# Patient Record
Sex: FEMALE | Race: WHITE | NOT HISPANIC OR LATINO | Employment: FULL TIME | URBAN - METROPOLITAN AREA
[De-identification: names, ages, dates, MRNs, and addresses within clinical notes are randomized per-mention and may not be internally consistent; named-entity substitution may affect disease eponyms.]

---

## 2017-10-24 ENCOUNTER — OFFICE VISIT (OUTPATIENT)
Dept: URGENT CARE | Facility: CLINIC | Age: 47
End: 2017-10-24
Payer: COMMERCIAL

## 2017-10-24 ENCOUNTER — HOSPITAL ENCOUNTER (OUTPATIENT)
Dept: RADIOLOGY | Facility: HOSPITAL | Age: 47
Discharge: HOME | End: 2017-10-24
Attending: PHYSICIAN ASSISTANT
Payer: COMMERCIAL

## 2017-10-24 VITALS
HEART RATE: 90 BPM | TEMPERATURE: 98 F | SYSTOLIC BLOOD PRESSURE: 110 MMHG | WEIGHT: 161.81 LBS | DIASTOLIC BLOOD PRESSURE: 80 MMHG | OXYGEN SATURATION: 100 % | HEIGHT: 64 IN | BODY MASS INDEX: 27.63 KG/M2

## 2017-10-24 DIAGNOSIS — M79.89 SWELLING OF LEFT THUMB: Primary | ICD-10-CM

## 2017-10-24 DIAGNOSIS — S63.622A SPRAIN OF INTERPHALANGEAL JOINT OF LEFT THUMB, INITIAL ENCOUNTER: ICD-10-CM

## 2017-10-24 DIAGNOSIS — M79.89 SWELLING OF LEFT THUMB: ICD-10-CM

## 2017-10-24 PROCEDURE — 99999 PR PBB SHADOW E&M-EST. PATIENT-LVL IV: CPT | Mod: PBBFAC,,, | Performed by: PHYSICIAN ASSISTANT

## 2017-10-24 PROCEDURE — 73140 X-RAY EXAM OF FINGER(S): CPT | Mod: 26,LT,, | Performed by: RADIOLOGY

## 2017-10-24 PROCEDURE — 99213 OFFICE O/P EST LOW 20 MIN: CPT | Mod: S$GLB,,, | Performed by: PHYSICIAN ASSISTANT

## 2017-10-24 PROCEDURE — 73140 X-RAY EXAM OF FINGER(S): CPT | Mod: TC,PO

## 2017-10-24 RX ORDER — NAPROXEN 500 MG/1
500 TABLET ORAL 2 TIMES DAILY PRN
Qty: 30 TABLET | Refills: 0 | Status: SHIPPED | OUTPATIENT
Start: 2017-10-24 | End: 2017-11-03

## 2017-10-24 NOTE — PATIENT INSTRUCTIONS
Finger Sprain  A sprain is a stretching or tearing of the ligaments that hold a joint together. There are no broken bones. Sprains take 3 to 6 weeks to heal.  A sprained finger may be treated with a splint or buddy tape. This is when you tape the injured finger to the one next to it for support. Minor sprains may require no additional support.  Home care  · Keep your hand elevated to reduce pain and swelling. This is very important during the first 48 hours.  · Apply an ice pack over the injured area for 15 to 20 minutes every 3 to 6 hours. You should do this for the first 24 to 48 hours. You can make an ice pack by filling a plastic bag that seals at the top with ice cubes and then wrapping it with a thin towel. Continue the use of ice packs for relief of pain and swelling as needed. As the ice melts, be careful to avoid getting any wrap or splint wet. After 48 hours, apply heat (warm shower or warm bath) for 15 to 20 minutes several times a day, or alternate ice and heat.  · If buddy tape was applied and it becomes wet or dirty, change it. You may replace it with paper, plastic or cloth tape. Cloth tape and paper tapes must be kept dry. Apply gauze or cotton padding between the fingers, especially at the webbed space. This will help prevent the skin from getting moist and breaking down. Keep the buddy tape in place for at least 4 weeks, or as instructed by your healthcare provider.  · If a splint was applied, wear it for the time advised.  · You may use over-the-counter pain medicine to control pain, unless another pain medicine was prescribed. If you have chronic liver or kidney disease or ever had a stomach ulcer or GI bleeding, talk with your healthcare provider before using these medicines.  Follow-up care  Follow up with your healthcare provider as directed. Finger joints will become stiff if immobile for too long. If a splint was applied, ask your healthcare provider when it is safe to begin  range-of-motion exercises.  Sometimes fractures dont show up on the first X-ray. Bruises and sprains can sometimes hurt as much as a fracture. These injuries can take time to heal completely. If your symptoms dont improve or they get worse, talk with your healthcare provider. You may need a repeat X-ray. If X-rays were taken, you will be told of any new findings that may affect your care.  When to seek medical advice  Call your healthcare provider right away if any of these occur:  · Pain or swelling increases  · Fingers or hand becomes cold, blue, numb, or tingly  Date Last Reviewed: 11/20/2015  © 5606-4619 [employer]. [address] [place] [address], [place] [id number]. All rights reserved. This information is not intended as a substitute for professional medical care. Always follow your healthcare professional's instructions.

## 2017-10-24 NOTE — PROGRESS NOTES
"Subjective:      Patient ID: Eunice Hooper is a 47 y.o. female.    Chief Complaint: Hand Pain    Ms. Hooper is a 46yo female that presents to Urgent Care for L thumb pain and swelling.  Patient was painting this weekend and held tray/cup with L hand while painting.  She doesn't recall any traumatic injury.  Patient denies any history of gout.  No warmth, erythema at the site.       Hand Pain    The incident occurred 3 to 5 days ago (Saturday evening). The incident occurred at home. There was no injury mechanism. The pain is present in the left fingers (L thumb). Pertinent negatives include no numbness or tingling. She has tried ice and NSAIDs (wrap, Tylenol #3) for the symptoms. The treatment provided no relief.     Review of Systems   Constitutional: Negative for chills, diaphoresis and fever.   Respiratory: Negative for cough, shortness of breath and wheezing.    Musculoskeletal: Positive for arthralgias (L thumb) and joint swelling (L thumb).   Skin: Negative for color change and wound.   Neurological: Negative for dizziness, tingling, numbness and headaches.       Objective:   /80 (BP Location: Right arm, Patient Position: Sitting, BP Method: Small (Manual))   Pulse 90   Temp 98.2 °F (36.8 °C) (Tympanic)   Ht 5' 4" (1.626 m)   Wt 73.4 kg (161 lb 13.1 oz)   SpO2 100%   BMI 27.78 kg/m²   Physical Exam   Constitutional: She appears well-developed and well-nourished. She does not appear ill. No distress.   HENT:   Head: Normocephalic and atraumatic.   Cardiovascular: Normal rate.    Good capillary refill  Normal radial pulse   Pulmonary/Chest: Effort normal. No respiratory distress.   Musculoskeletal:   (+) TTP and swelling of L thumb at IP joint  Decreased ROM d/t pain   Skin: Skin is warm and dry. No rash noted. She is not diaphoretic.   Psychiatric: She has a normal mood and affect. Her speech is normal and behavior is normal. Thought content normal.     Assessment:      1. Swelling of left " thumb    2. Sprain of interphalangeal joint of left thumb, initial encounter       Plan:   Swelling of left thumb  -     X-Ray Finger 2 or More Views; Future; Expected date: 10/24/2017    Sprain of interphalangeal joint of left thumb, initial encounter  -     naproxen (NAPROSYN) 500 MG tablet; Take 1 tablet (500 mg total) by mouth 2 (two) times daily as needed (pain).  Dispense: 30 tablet; Refill: 0      Reviewed negative x-ray results.    Gave handout on finger sprain.  Printed and reviewed AVS.  Discussed RICE and Naproxen as needed.  If symptoms worsen or do not improve, will refer to ortho.   Patient expresses understanding and agrees with treatment plan.

## 2018-03-05 ENCOUNTER — OFFICE VISIT (OUTPATIENT)
Dept: URGENT CARE | Facility: CLINIC | Age: 48
End: 2018-03-05
Payer: COMMERCIAL

## 2018-03-05 VITALS
BODY MASS INDEX: 28 KG/M2 | WEIGHT: 163.13 LBS | OXYGEN SATURATION: 99 % | RESPIRATION RATE: 16 BRPM | SYSTOLIC BLOOD PRESSURE: 120 MMHG | HEART RATE: 77 BPM | TEMPERATURE: 99 F | DIASTOLIC BLOOD PRESSURE: 80 MMHG

## 2018-03-05 DIAGNOSIS — R10.13 EPIGASTRIC PAIN: Primary | ICD-10-CM

## 2018-03-05 DIAGNOSIS — R11.0 NAUSEA: ICD-10-CM

## 2018-03-05 PROCEDURE — 99999 PR PBB SHADOW E&M-EST. PATIENT-LVL IV: CPT | Mod: PBBFAC,,, | Performed by: NURSE PRACTITIONER

## 2018-03-05 PROCEDURE — 99214 OFFICE O/P EST MOD 30 MIN: CPT | Mod: S$GLB,,, | Performed by: NURSE PRACTITIONER

## 2018-03-05 RX ORDER — HYDROGEN PEROXIDE 3 %
20 SOLUTION, NON-ORAL MISCELLANEOUS
Qty: 30 CAPSULE | Refills: 0 | Status: SHIPPED | OUTPATIENT
Start: 2018-03-05 | End: 2018-10-18

## 2018-03-05 RX ORDER — ONDANSETRON 4 MG/1
4 TABLET, ORALLY DISINTEGRATING ORAL EVERY 8 HOURS PRN
Qty: 10 TABLET | Refills: 0 | Status: SHIPPED | OUTPATIENT
Start: 2018-03-05 | End: 2018-10-18

## 2018-03-05 NOTE — PROGRESS NOTES
Subjective:       Patient ID: Eunice Hooper is a 47 y.o. female.    Chief Complaint: Abdominal Pain    Pt is a 47 year old female to clinic today with complaints of epigastric pain that began Friday. Pt states pain is worse after eating.       Abdominal Pain   This is a new problem. The current episode started in the past 7 days. The onset quality is gradual. The problem occurs constantly. The problem has been unchanged. The pain is located in the epigastric region. The pain is at a severity of 8/10. The pain is severe. The quality of the pain is sharp. The abdominal pain does not radiate. Associated symptoms include belching, flatus and nausea. Pertinent negatives include no anorexia, arthralgias, constipation, diarrhea, dysuria, fever, frequency, headaches, hematochezia, hematuria, melena, myalgias, vomiting or weight loss. The pain is aggravated by eating. The pain is relieved by nothing. She has tried acetaminophen (ibuprofen, pepto) for the symptoms. Her past medical history is significant for abdominal surgery (gastric bypass (reversed)) and GERD.     Review of Systems   Constitutional: Negative for activity change, appetite change, chills, diaphoresis, fatigue, fever and weight loss.   HENT: Negative for congestion, sinus pressure and sore throat.    Eyes: Negative for pain.   Respiratory: Negative for cough, chest tightness, shortness of breath and wheezing.    Cardiovascular: Negative for chest pain and palpitations.   Gastrointestinal: Positive for abdominal pain, flatus and nausea. Negative for abdominal distention, anorexia, blood in stool, constipation, diarrhea, hematochezia, melena and vomiting.   Genitourinary: Negative for dysuria, frequency and hematuria.   Musculoskeletal: Negative for arthralgias, myalgias and neck pain.   Skin: Negative for rash.   Neurological: Negative for dizziness, weakness, light-headedness and headaches.       Objective:      Physical Exam   Constitutional: She  is oriented to person, place, and time. She appears well-developed and well-nourished.  Non-toxic appearance. She does not have a sickly appearance. She does not appear ill. No distress.   HENT:   Head: Normocephalic.   Right Ear: External ear normal.   Left Ear: External ear normal.   Nose: Nose normal.   Mouth/Throat: Oropharynx is clear and moist and mucous membranes are normal.   Eyes: Pupils are equal, round, and reactive to light.   Abdominal: Soft. Normal appearance and bowel sounds are normal. She exhibits no distension. There is no tenderness. There is no rigidity, no rebound, no guarding, no tenderness at McBurney's point and negative Diaz's sign.   Neurological: She is alert and oriented to person, place, and time.   Skin: Skin is warm and dry. No rash noted. She is not diaphoretic.   Psychiatric: She has a normal mood and affect. Her speech is normal and behavior is normal. Thought content normal.   Nursing note and vitals reviewed.      Assessment:       1. Epigastric pain    2. Nausea        Plan:   Epigastric pain  -     (pyxis) gi cocktail (mylanta 30 mL, lidocaine 2 % viscous 10 mL, dicyclomine 10 mL) 50 mL; Take by mouth one time.  -     esomeprazole (NEXIUM) 20 MG capsule; Take 1 capsule (20 mg total) by mouth before breakfast. Take 1 hour before meal.  Dispense: 30 capsule; Refill: 0    Nausea  -     ondansetron (ZOFRAN-ODT) 4 MG TbDL; Take 1 tablet (4 mg total) by mouth every 8 (eight) hours as needed (nausea).  Dispense: 10 tablet; Refill: 0      Pt states significant improvement post GI cocktail.  Recommend pt f/u with GI if symptoms persist.     Follow prescribed treatment plan as directed.  Stay hydrated and rest.  Report to ER if symptoms worsen.  Follow up with PCP in 2-3 days or sooner if symptoms do not improve.

## 2018-03-06 NOTE — PATIENT INSTRUCTIONS
Epigastric Pain (Uncertain Cause)     Epigastric pain can be a sign of disease in the upper abdomen. Common causes include:  · Acid reflux (stomach acid flowing up into the esophagus)  · Gastritis (irritation of the stomach lining)  · Peptic Ulcer Disease  · Inflammation of the pancreas  · Gallstone  · Infection in the gallbladder  Pain may be dull or burning. It may spread upward to the chest or to the back. There may be other symptoms such as belching, bloating, cramps or hunger pains. There may be weight loss or poor appetite, nausea or vomiting.  Since the diagnosis of your pain is not certain yet, further tests may sometimes be needed. Sometimes the doctor will treat you for the most likely condition to see if there is improvement before doing further tests.  Home care  Medicines  · Antacids help neutralize the normal acids in your stomach. Examples are Maalox, Mylanta, Rolaids, and Tums. If you dont like the liquid, you can also try a chewable one. You may find one works better than another for you. Overuse can cause diarrhea or constipation.  · Acid blockers (H2 blockers) decrease acid production. Examples are cimetidine (Tagamet), famotidine (Pepcid) and ranitidine (Zantac).  · Acid inhibitors (PPIs) decrease acid production in a different way than the blockers. You may find they work better, but can take a little longer to take effect.  Examples are omeprazole (Prilosec), lansoprazole (Prevacid), pantoprazole (Protonix), rabeprazole (Aciphex), and esomeprazole (Nexium).  · Take an antacid 30-60 minutes after eating and at bedtime, but not at the same time as an acid blocker.  · Try not to take NSAIDs. Aspirin may also cause problems, but if taking it for your heart or other medical reasons, talk to your doctor before stopping it; you do not want to cause a worse problem, like a heart attack or stroke.  Diet  · If certain foods seem to cause your spasm, try to avoid them.   · Eat slowly and chew food well  before swallowing. Symptoms of gastritis can be worsened by certain foods. Limit or avoid fatty, fried, and spicy foods, as well as coffee, chocolate, mint, and foods with high acid content such as tomatoes and citrus fruit and juices (orange, grapefruit, lemon).  · Avoid alcohol, caffeine, and tobacco, which can delay healing and worsen your problem.  · Try eating smaller meals with snacks in between  Follow-up care  Follow up with your healthcare provider or as advised.  When to seek medical advice  Call your healthcare provider right away if any of the following occur:  · Stomach pain worsens or moves to the right lower part of the abdomen  · Chest pain appears, or if it worsens or spreads to the chest, back, neck, shoulder, or arm  · Frequent vomiting (cant keep down liquids)  · Blood in the stool or vomit (red or black color)  · Feeling weak or dizzy, fainting, or having trouble breathing  · Fever of 100.4ºF (38ºC) or higher, or as directed by your healthcare provider  · Abdominal swelling  Date Last Reviewed: 9/25/2015  © 6496-1718 [employer]. [address] [place] [address]. All rights reserved. This information is not intended as a substitute for professional medical care. Always follow your healthcare professional's instructions.        Epigastric Pain (Uncertain Cause)     Epigastric pain can be a sign of disease in the upper abdomen. Common causes include:  · Acid reflux (stomach acid flowing up into the esophagus)  · Gastritis (irritation of the stomach lining)  · Peptic Ulcer Disease  · Inflammation of the pancreas  · Gallstone  · Infection in the gallbladder  Pain may be dull or burning. It may spread upward to the chest or to the back. There may be other symptoms such as belching, bloating, cramps or hunger pains. There may be weight loss or poor appetite, nausea or vomiting.  Since the diagnosis of your pain is not certain yet, further tests may sometimes be needed.  Sometimes the doctor will treat you for the most likely condition to see if there is improvement before doing further tests.  Home care  Medicines  · Antacids help neutralize the normal acids in your stomach. Examples are Maalox, Mylanta, Rolaids, and Tums. If you dont like the liquid, you can also try a chewable one. You may find one works better than another for you. Overuse can cause diarrhea or constipation.  · Acid blockers (H2 blockers) decrease acid production. Examples are cimetidine (Tagamet), famotidine (Pepcid) and ranitidine (Zantac).  · Acid inhibitors (PPIs) decrease acid production in a different way than the blockers. You may find they work better, but can take a little longer to take effect.  Examples are omeprazole (Prilosec), lansoprazole (Prevacid), pantoprazole (Protonix), rabeprazole (Aciphex), and esomeprazole (Nexium).  · Take an antacid 30-60 minutes after eating and at bedtime, but not at the same time as an acid blocker.  · Try not to take NSAIDs. Aspirin may also cause problems, but if taking it for your heart or other medical reasons, talk to your doctor before stopping it; you do not want to cause a worse problem, like a heart attack or stroke.  Diet  · If certain foods seem to cause your spasm, try to avoid them.   · Eat slowly and chew food well before swallowing. Symptoms of gastritis can be worsened by certain foods. Limit or avoid fatty, fried, and spicy foods, as well as coffee, chocolate, mint, and foods with high acid content such as tomatoes and citrus fruit and juices (orange, grapefruit, lemon).  · Avoid alcohol, caffeine, and tobacco, which can delay healing and worsen your problem.  · Try eating smaller meals with snacks in between  Follow-up care  Follow up with your healthcare provider or as advised.  When to seek medical advice  Call your healthcare provider right away if any of the following occur:  · Stomach pain worsens or moves to the right lower part of the  abdomen  · Chest pain appears, or if it worsens or spreads to the chest, back, neck, shoulder, or arm  · Frequent vomiting (cant keep down liquids)  · Blood in the stool or vomit (red or black color)  · Feeling weak or dizzy, fainting, or having trouble breathing  · Fever of 100.4ºF (38ºC) or higher, or as directed by your healthcare provider  · Abdominal swelling  Date Last Reviewed: 9/25/2015 © 2000-2017 [employer]. [address] [place], [place] [id number]. All rights reserved. This information is not intended as a substitute for professional medical care. Always follow your healthcare professional's instructions.

## 2018-10-18 ENCOUNTER — OFFICE VISIT (OUTPATIENT)
Dept: INTERNAL MEDICINE | Facility: CLINIC | Age: 48
End: 2018-10-18
Payer: COMMERCIAL

## 2018-10-18 VITALS
TEMPERATURE: 98 F | HEIGHT: 64 IN | HEART RATE: 62 BPM | WEIGHT: 163.38 LBS | DIASTOLIC BLOOD PRESSURE: 84 MMHG | BODY MASS INDEX: 27.89 KG/M2 | SYSTOLIC BLOOD PRESSURE: 120 MMHG

## 2018-10-18 DIAGNOSIS — Z12.39 BREAST CANCER SCREENING: ICD-10-CM

## 2018-10-18 DIAGNOSIS — Z29.9 PREVENTIVE MEASURE: ICD-10-CM

## 2018-10-18 DIAGNOSIS — R07.9 CHEST PAIN, UNSPECIFIED TYPE: ICD-10-CM

## 2018-10-18 DIAGNOSIS — F41.1 GAD (GENERALIZED ANXIETY DISORDER): Primary | ICD-10-CM

## 2018-10-18 DIAGNOSIS — R45.89 DEPRESSED MOOD: ICD-10-CM

## 2018-10-18 DIAGNOSIS — R06.02 SOB (SHORTNESS OF BREATH) ON EXERTION: ICD-10-CM

## 2018-10-18 PROCEDURE — 93005 ELECTROCARDIOGRAM TRACING: CPT | Mod: S$GLB,,, | Performed by: FAMILY MEDICINE

## 2018-10-18 PROCEDURE — 93010 ELECTROCARDIOGRAM REPORT: CPT | Mod: S$GLB,,, | Performed by: INTERNAL MEDICINE

## 2018-10-18 PROCEDURE — 3008F BODY MASS INDEX DOCD: CPT | Mod: CPTII,S$GLB,, | Performed by: FAMILY MEDICINE

## 2018-10-18 PROCEDURE — 99999 PR PBB SHADOW E&M-EST. PATIENT-LVL III: CPT | Mod: PBBFAC,,, | Performed by: FAMILY MEDICINE

## 2018-10-18 PROCEDURE — 99214 OFFICE O/P EST MOD 30 MIN: CPT | Mod: S$GLB,,, | Performed by: FAMILY MEDICINE

## 2018-10-18 RX ORDER — VENLAFAXINE HYDROCHLORIDE 37.5 MG/1
37.5 CAPSULE, EXTENDED RELEASE ORAL DAILY
Qty: 30 CAPSULE | Refills: 11 | Status: SHIPPED | OUTPATIENT
Start: 2018-10-18 | End: 2019-11-04 | Stop reason: SDUPTHER

## 2018-10-18 RX ORDER — ATORVASTATIN CALCIUM 20 MG/1
1 TABLET, FILM COATED ORAL DAILY
Refills: 2 | COMMUNITY
Start: 2018-10-05 | End: 2019-10-24 | Stop reason: SDUPTHER

## 2018-10-18 RX ORDER — DULOXETINE 40 MG/1
1 CAPSULE, DELAYED RELEASE ORAL DAILY
Refills: 0 | COMMUNITY
Start: 2018-09-11 | End: 2018-10-18 | Stop reason: SINTOL

## 2018-10-18 RX ORDER — ESOMEPRAZOLE MAGNESIUM 40 MG/1
1 CAPSULE, DELAYED RELEASE ORAL DAILY
Refills: 2 | COMMUNITY
Start: 2018-10-06 | End: 2019-12-03

## 2018-10-18 RX ORDER — ALPRAZOLAM 0.5 MG/1
1 TABLET ORAL 2 TIMES DAILY PRN
Refills: 2 | COMMUNITY
Start: 2018-10-05 | End: 2019-01-24 | Stop reason: SDUPTHER

## 2018-10-18 RX ORDER — ASPIRIN 325 MG
1 TABLET, DELAYED RELEASE (ENTERIC COATED) ORAL
Refills: 1 | COMMUNITY
Start: 2018-10-09 | End: 2019-12-03

## 2018-10-18 NOTE — PROGRESS NOTES
Subjective:      Patient ID: Eunice Hooper is a 48 y.o. female.    Chief Complaint: Establish Care (discuss medication )    HPI  47 yo female smoker with hyperlipidemia here to establish care.  She has been on cymbalta for anxiety/depression.  It wasn't working well so it was increased.  At higher doses she had bad nightmares.  She told her other PCP and was advised to start wellbutrin.  She stopped the cymbalta and started the wellbutrin but started having clicking in her brain.  Went back on cymbalta and stopped the wellbutrin and felt better but wants to try something else b/c of nightmares.  She has panic attacks, uses Xanax PRN.  She is on stain daily as well as PPI.  She is taking high dose of Vit D.  She reports that she has issues at times with chest pain/pressure and SOB on exertion.  For instance, at works if she is trying to lift boxes and physically exert herself she gets tight in her chest and has to stop.  It can happen at home as well with exertion.  She told last PCP, had normal EKG.  Strong fam hx of heart disease.    Past Medical History:   Diagnosis Date    Anxiety     Depression     GERD (gastroesophageal reflux disease)     Hyperlipidemia      Family History   Problem Relation Age of Onset    Heart disease Father     Breast cancer Maternal Grandmother     Diabetes Paternal Grandmother     Heart disease Paternal Grandfather     Breast cancer Maternal Aunt     Breast cancer Maternal Cousin     Colon cancer Neg Hx      Past Surgical History:   Procedure Laterality Date    BARIATRIC SURGERY      Gastric bypass    breast augmentation      HYSTERECTOMY      TUBAL LIGATION       Social History     Tobacco Use    Smoking status: Current Every Day Smoker     Packs/day: 1.00     Years: 20.00     Pack years: 20.00     Types: Cigarettes    Smokeless tobacco: Never Used   Substance Use Topics    Alcohol use: Yes     Comment: occasional    Drug use: No       /84 (BP  "Location: Left arm, Patient Position: Sitting, BP Method: Large (Manual))   Pulse 62   Temp 98 °F (36.7 °C) (Tympanic)   Ht 5' 3.75" (1.619 m)   Wt 74.1 kg (163 lb 5.8 oz)   BMI 28.26 kg/m²     Review of Systems   Constitutional: Negative for activity change and unexpected weight change.   HENT: Negative for hearing loss, rhinorrhea and trouble swallowing.    Eyes: Negative for discharge and visual disturbance.   Respiratory: Positive for shortness of breath. Negative for chest tightness and wheezing.    Cardiovascular: Positive for chest pain. Negative for palpitations.   Gastrointestinal: Negative for blood in stool, constipation, diarrhea and vomiting.   Endocrine: Negative for polydipsia and polyuria.   Genitourinary: Negative for difficulty urinating, dysuria, hematuria and menstrual problem.   Musculoskeletal: Negative for arthralgias, joint swelling and neck pain.   Neurological: Positive for headaches. Negative for weakness.   Psychiatric/Behavioral: Positive for dysphoric mood. Negative for confusion.       Objective:     Physical Exam   Constitutional: She is oriented to person, place, and time. She appears well-developed and well-nourished. No distress.   HENT:   Right Ear: External ear normal.   Left Ear: External ear normal.   Nose: Nose normal.   Mouth/Throat: Oropharynx is clear and moist.   Eyes: Conjunctivae are normal. Pupils are equal, round, and reactive to light.   Neck: Normal range of motion. Neck supple. Carotid bruit is not present. No thyromegaly present.   Cardiovascular: Normal rate, regular rhythm and normal heart sounds.   Pulmonary/Chest: Effort normal and breath sounds normal. No respiratory distress. She has no wheezes. She has no rales.   Abdominal: Soft. Bowel sounds are normal. She exhibits no distension. There is no tenderness. There is no guarding.   Musculoskeletal: She exhibits no edema.   Lymphadenopathy:     She has no cervical adenopathy.   Neurological: She is alert " and oriented to person, place, and time. No cranial nerve deficit.   Skin: Skin is warm and dry. No rash noted.   Psychiatric: She has a normal mood and affect. Her behavior is normal. Judgment and thought content normal.   Nursing note and vitals reviewed.      Lab Results   Component Value Date    WBC 9.00 04/28/2010    HGB 13.0 04/28/2010    HCT 37.7 04/28/2010     04/28/2010    ALT 29 04/28/2010    AST 13 04/28/2010     04/28/2010    K 3.7 04/28/2010     04/28/2010    CREATININE 1.0 04/28/2010    BUN 10 04/28/2010    CO2 26.5 04/28/2010       Assessment:     1. CATINA (generalized anxiety disorder)    2. Depressed mood    3. Chest pain, unspecified type    4. SOB (shortness of breath) on exertion    5. Breast cancer screening    6. Preventive measure         Plan:     CATINA (generalized anxiety disorder)    Depressed mood    Chest pain, unspecified type  -     EKG 12-lead    SOB (shortness of breath) on exertion  -     EKG 12-lead    Breast cancer screening  -     Mammo Digital Screening Bilat with CAD; Future; Expected date: 10/18/2018    Preventive measure  -     CBC auto differential; Future; Expected date: 11/15/2018  -     Comprehensive metabolic panel; Future; Expected date: 11/15/2018  -     Hemoglobin A1c; Future; Expected date: 11/15/2018  -     Lipid panel; Future; Expected date: 11/15/2018  -     TSH; Future; Expected date: 11/15/2018  -     Vitamin D; Future; Expected date: 11/15/2018  -     Ferritin; Future; Expected date: 11/15/2018    Other orders  -     venlafaxine (EFFEXOR-XR) 37.5 MG 24 hr capsule; Take 1 capsule (37.5 mg total) by mouth once daily.  Dispense: 30 capsule; Refill: 11    Stop the wellbutrin and cymbalta  Start Effexor 37.5mg with intention of increasing in a few wks.  Pt can let me know if having any SE.  Xanax PRN panic attacks  EKG done today, will see how baseline looks but given risk factors pt will need stress test.  Will call with orders once above is  reviewed  Update labs/Mammo and f/u in 4 wks  Smoking cessation advised

## 2018-10-19 ENCOUNTER — PATIENT MESSAGE (OUTPATIENT)
Dept: INTERNAL MEDICINE | Facility: CLINIC | Age: 48
End: 2018-10-19

## 2018-10-22 ENCOUNTER — HOSPITAL ENCOUNTER (OUTPATIENT)
Dept: RADIOLOGY | Facility: HOSPITAL | Age: 48
Discharge: HOME | End: 2018-10-22
Attending: FAMILY MEDICINE
Payer: COMMERCIAL

## 2018-10-22 VITALS — HEIGHT: 64 IN | WEIGHT: 163 LBS | BODY MASS INDEX: 27.83 KG/M2

## 2018-10-22 DIAGNOSIS — Z12.39 BREAST CANCER SCREENING: ICD-10-CM

## 2018-10-22 PROCEDURE — 77067 SCR MAMMO BI INCL CAD: CPT | Mod: 26,,, | Performed by: RADIOLOGY

## 2018-10-22 PROCEDURE — 77063 BREAST TOMOSYNTHESIS BI: CPT | Mod: 26,,, | Performed by: RADIOLOGY

## 2018-10-22 PROCEDURE — 77063 BREAST TOMOSYNTHESIS BI: CPT | Mod: TC,PO

## 2018-10-22 PROCEDURE — 77067 SCR MAMMO BI INCL CAD: CPT | Mod: TC,PO

## 2018-11-12 ENCOUNTER — LAB VISIT (OUTPATIENT)
Dept: LAB | Facility: HOSPITAL | Age: 48
End: 2018-11-12
Attending: FAMILY MEDICINE
Payer: COMMERCIAL

## 2018-11-12 DIAGNOSIS — Z29.9 PREVENTIVE MEASURE: ICD-10-CM

## 2018-11-12 LAB
ALBUMIN SERPL BCP-MCNC: 3.6 G/DL
ALP SERPL-CCNC: 60 U/L
ALT SERPL W/O P-5'-P-CCNC: 44 U/L
ANION GAP SERPL CALC-SCNC: 9 MMOL/L
AST SERPL-CCNC: 46 U/L
BASOPHILS # BLD AUTO: 0.08 K/UL
BASOPHILS NFR BLD: 0.9 %
BILIRUB SERPL-MCNC: 0.5 MG/DL
BUN SERPL-MCNC: 15 MG/DL
CALCIUM SERPL-MCNC: 9.4 MG/DL
CHLORIDE SERPL-SCNC: 107 MMOL/L
CHOLEST SERPL-MCNC: 203 MG/DL
CHOLEST/HDLC SERPL: 4.4 {RATIO}
CO2 SERPL-SCNC: 26 MMOL/L
CREAT SERPL-MCNC: 0.8 MG/DL
DIFFERENTIAL METHOD: ABNORMAL
EOSINOPHIL # BLD AUTO: 0.2 K/UL
EOSINOPHIL NFR BLD: 2.7 %
ERYTHROCYTE [DISTWIDTH] IN BLOOD BY AUTOMATED COUNT: 14 %
EST. GFR  (AFRICAN AMERICAN): >60 ML/MIN/1.73 M^2
EST. GFR  (NON AFRICAN AMERICAN): >60 ML/MIN/1.73 M^2
ESTIMATED AVG GLUCOSE: 94 MG/DL
FERRITIN SERPL-MCNC: 79 NG/ML
GLUCOSE SERPL-MCNC: 69 MG/DL
HBA1C MFR BLD HPLC: 4.9 %
HCT VFR BLD AUTO: 46.3 %
HDLC SERPL-MCNC: 46 MG/DL
HDLC SERPL: 22.7 %
HGB BLD-MCNC: 14.7 G/DL
IMM GRANULOCYTES # BLD AUTO: 0.03 K/UL
IMM GRANULOCYTES NFR BLD AUTO: 0.3 %
LDLC SERPL CALC-MCNC: 135.2 MG/DL
LYMPHOCYTES # BLD AUTO: 2 K/UL
LYMPHOCYTES NFR BLD: 22.9 %
MCH RBC QN AUTO: 29.2 PG
MCHC RBC AUTO-ENTMCNC: 31.7 G/DL
MCV RBC AUTO: 92 FL
MONOCYTES # BLD AUTO: 0.6 K/UL
MONOCYTES NFR BLD: 6.6 %
NEUTROPHILS # BLD AUTO: 5.9 K/UL
NEUTROPHILS NFR BLD: 66.6 %
NONHDLC SERPL-MCNC: 157 MG/DL
NRBC BLD-RTO: 0 /100 WBC
PLATELET # BLD AUTO: 289 K/UL
PMV BLD AUTO: 11.3 FL
POTASSIUM SERPL-SCNC: 3.9 MMOL/L
PROT SERPL-MCNC: 6.6 G/DL
RBC # BLD AUTO: 5.03 M/UL
SODIUM SERPL-SCNC: 142 MMOL/L
TRIGL SERPL-MCNC: 109 MG/DL
TSH SERPL DL<=0.005 MIU/L-ACNC: 1.41 UIU/ML
WBC # BLD AUTO: 8.83 K/UL

## 2018-11-12 PROCEDURE — 80053 COMPREHEN METABOLIC PANEL: CPT

## 2018-11-12 PROCEDURE — 85025 COMPLETE CBC W/AUTO DIFF WBC: CPT

## 2018-11-12 PROCEDURE — 82306 VITAMIN D 25 HYDROXY: CPT

## 2018-11-12 PROCEDURE — 36415 COLL VENOUS BLD VENIPUNCTURE: CPT | Mod: PO

## 2018-11-12 PROCEDURE — 83036 HEMOGLOBIN GLYCOSYLATED A1C: CPT

## 2018-11-12 PROCEDURE — 84443 ASSAY THYROID STIM HORMONE: CPT

## 2018-11-12 PROCEDURE — 80061 LIPID PANEL: CPT

## 2018-11-12 PROCEDURE — 82728 ASSAY OF FERRITIN: CPT

## 2018-11-13 LAB — 25(OH)D3+25(OH)D2 SERPL-MCNC: 34 NG/ML

## 2018-11-20 ENCOUNTER — TELEPHONE (OUTPATIENT)
Dept: INTERNAL MEDICINE | Facility: CLINIC | Age: 48
End: 2018-11-20

## 2018-11-20 ENCOUNTER — OFFICE VISIT (OUTPATIENT)
Dept: INTERNAL MEDICINE | Facility: CLINIC | Age: 48
End: 2018-11-20
Payer: COMMERCIAL

## 2018-11-20 VITALS
WEIGHT: 163.56 LBS | SYSTOLIC BLOOD PRESSURE: 118 MMHG | TEMPERATURE: 98 F | BODY MASS INDEX: 27.92 KG/M2 | HEART RATE: 76 BPM | DIASTOLIC BLOOD PRESSURE: 70 MMHG | HEIGHT: 64 IN

## 2018-11-20 DIAGNOSIS — Z00.00 ANNUAL PHYSICAL EXAM: Primary | ICD-10-CM

## 2018-11-20 DIAGNOSIS — R07.9 CHEST PAIN, UNSPECIFIED TYPE: ICD-10-CM

## 2018-11-20 DIAGNOSIS — Z12.4 CERVICAL CANCER SCREENING: ICD-10-CM

## 2018-11-20 PROCEDURE — 99396 PREV VISIT EST AGE 40-64: CPT | Mod: 25,S$GLB,, | Performed by: FAMILY MEDICINE

## 2018-11-20 PROCEDURE — 90715 TDAP VACCINE 7 YRS/> IM: CPT | Mod: S$GLB,,, | Performed by: FAMILY MEDICINE

## 2018-11-20 PROCEDURE — 90471 IMMUNIZATION ADMIN: CPT | Mod: S$GLB,,, | Performed by: FAMILY MEDICINE

## 2018-11-20 PROCEDURE — 99999 PR PBB SHADOW E&M-EST. PATIENT-LVL III: CPT | Mod: PBBFAC,,, | Performed by: FAMILY MEDICINE

## 2018-11-20 RX ORDER — MUPIROCIN 20 MG/G
OINTMENT TOPICAL 2 TIMES DAILY
Qty: 30 G | Refills: 0 | Status: SHIPPED | OUTPATIENT
Start: 2018-11-20 | End: 2018-11-21 | Stop reason: SDUPTHER

## 2018-11-20 NOTE — PROGRESS NOTES
"Subjective:      Patient ID: Eunice Hooper is a 48 y.o. female.    Chief Complaint: Annual Exam; Chest Pain; and Abscess    HPI  49 yo female here for annual visit.  Doing well on Effexor so far.  Cont to have chest pain with exertion and activity.  She also has a small bump in vaginal area.  She was able to open one of them but one is still swollen and hurting.  No fever/chills.    Mammo was done    Past Medical History:   Diagnosis Date    Anxiety     Depression     GERD (gastroesophageal reflux disease)     Hyperlipidemia      Family History   Problem Relation Age of Onset    Heart disease Father     Breast cancer Maternal Grandmother     Diabetes Paternal Grandmother     Heart disease Paternal Grandfather     Breast cancer Maternal Aunt     Breast cancer Maternal Cousin     Colon cancer Neg Hx      Past Surgical History:   Procedure Laterality Date    BARIATRIC SURGERY      Gastric bypass    breast augmentation      BREAST SURGERY Bilateral     10/2016    HYSTERECTOMY      TUBAL LIGATION       Social History     Tobacco Use    Smoking status: Current Every Day Smoker     Packs/day: 1.00     Years: 20.00     Pack years: 20.00     Types: Cigarettes    Smokeless tobacco: Never Used   Substance Use Topics    Alcohol use: Yes     Comment: occasional    Drug use: No       /70 (BP Location: Right arm, Patient Position: Sitting, BP Method: Large (Manual))   Pulse 76   Temp 97.9 °F (36.6 °C) (Oral)   Ht 5' 4" (1.626 m)   Wt 74.2 kg (163 lb 9.3 oz)   BMI 28.08 kg/m²     Review of Systems   Constitutional: Negative for activity change, appetite change, chills, diaphoresis, fatigue, fever and unexpected weight change.   HENT: Negative for ear pain, hearing loss, postnasal drip, rhinorrhea and tinnitus.    Eyes: Negative for visual disturbance.   Respiratory: Negative for cough, shortness of breath and wheezing.    Cardiovascular: Positive for chest pain. Negative for palpitations and " leg swelling.   Gastrointestinal: Negative for abdominal distention.   Genitourinary: Negative for dysuria, frequency, hematuria and urgency.   Musculoskeletal: Negative for joint swelling.   Neurological: Negative for weakness and headaches.       Objective:     Physical Exam   Constitutional: She is oriented to person, place, and time. She appears well-developed and well-nourished. No distress.   HENT:   Right Ear: External ear normal.   Left Ear: External ear normal.   Nose: Nose normal.   Mouth/Throat: Oropharynx is clear and moist.   Eyes: Conjunctivae are normal. Pupils are equal, round, and reactive to light.   Neck: Normal range of motion. Neck supple. Carotid bruit is not present.   Cardiovascular: Normal rate, regular rhythm and normal heart sounds.   Pulmonary/Chest: Effort normal and breath sounds normal. No respiratory distress. She has no wheezes. She has no rales.   Abdominal: Soft. Bowel sounds are normal. She exhibits no distension. There is no tenderness. There is no guarding.   Genitourinary: Rectum normal and vagina normal. Right adnexum displays no tenderness and no fullness. Left adnexum displays no tenderness and no fullness.   Genitourinary Comments: Vaginal tissue normal  Uterus absent  Small ingrown hair on lower left inner thigh near vaginal area.   Musculoskeletal: She exhibits no edema.   Neurological: She is alert and oriented to person, place, and time. No cranial nerve deficit.   Skin: Skin is warm and dry. No rash noted.   Psychiatric: She has a normal mood and affect. Her behavior is normal. Judgment and thought content normal.   Nursing note and vitals reviewed.      Lab Results   Component Value Date    WBC 8.83 11/12/2018    HGB 14.7 11/12/2018    HCT 46.3 11/12/2018     11/12/2018    CHOL 203 (H) 11/12/2018    TRIG 109 11/12/2018    HDL 46 11/12/2018    ALT 44 11/12/2018    AST 46 (H) 11/12/2018     11/12/2018    K 3.9 11/12/2018     11/12/2018    CREATININE  0.8 11/12/2018    BUN 15 11/12/2018    CO2 26 11/12/2018    TSH 1.406 11/12/2018    HGBA1C 4.9 11/12/2018       Assessment:     1. Annual physical exam    2. Cervical cancer screening    3. Chest pain, unspecified type         Plan:     Annual physical exam    Cervical cancer screening  -     Cancel: Liquid-based pap smear, screening    Chest pain, unspecified type  -     Ambulatory referral to Cardiology    Other orders  -     (In Office Administered) Tdap Vaccine    22 gauge needle used to remove the top of the ingrown hair.  +pus/minimally.  No induration/fluctuation  Soaks daily, can use topical bactroban.  Cardiology referral//strong fam hx.  Pt on lipitor/smoker  Reviewed labs, stable  Focus on healthy eating/exercise and smoking cessation.  Adacel shot today  F/u annually and PRN

## 2018-11-20 NOTE — TELEPHONE ENCOUNTER
Let pt know that I went on and sent in a topical Abx ointment to use on the ingrown hair.    Also let her know I am not sending off for pap since she has no cervix.  Her pelvic exam is normal.

## 2018-11-21 ENCOUNTER — PATIENT MESSAGE (OUTPATIENT)
Dept: INTERNAL MEDICINE | Facility: CLINIC | Age: 48
End: 2018-11-21

## 2018-11-21 RX ORDER — ONDANSETRON 8 MG/1
8 TABLET, ORALLY DISINTEGRATING ORAL EVERY 8 HOURS PRN
Qty: 15 TABLET | Refills: 0 | Status: SHIPPED | OUTPATIENT
Start: 2018-11-21 | End: 2019-05-24 | Stop reason: SDUPTHER

## 2018-11-23 RX ORDER — MUPIROCIN 20 MG/G
OINTMENT TOPICAL
Qty: 22 G | Refills: 0 | Status: SHIPPED | OUTPATIENT
Start: 2018-11-23 | End: 2019-12-03

## 2018-12-04 ENCOUNTER — OFFICE VISIT (OUTPATIENT)
Dept: CARDIOLOGY | Facility: CLINIC | Age: 48
End: 2018-12-04
Payer: COMMERCIAL

## 2018-12-04 VITALS
HEIGHT: 64 IN | SYSTOLIC BLOOD PRESSURE: 98 MMHG | BODY MASS INDEX: 27.96 KG/M2 | WEIGHT: 163.81 LBS | DIASTOLIC BLOOD PRESSURE: 62 MMHG | HEART RATE: 68 BPM

## 2018-12-04 DIAGNOSIS — F17.200 SMOKER: ICD-10-CM

## 2018-12-04 DIAGNOSIS — E78.00 PURE HYPERCHOLESTEROLEMIA: ICD-10-CM

## 2018-12-04 DIAGNOSIS — R07.9 CHEST PAIN, MODERATE CORONARY ARTERY RISK: ICD-10-CM

## 2018-12-04 DIAGNOSIS — Z82.49 FAMILY HISTORY OF ARTERIOSCLEROTIC CARDIOVASCULAR DISEASE: ICD-10-CM

## 2018-12-04 DIAGNOSIS — G89.29 CHRONIC CHEST PAIN: ICD-10-CM

## 2018-12-04 DIAGNOSIS — R06.02 SOB (SHORTNESS OF BREATH): ICD-10-CM

## 2018-12-04 DIAGNOSIS — R07.9 CHRONIC CHEST PAIN: ICD-10-CM

## 2018-12-04 PROCEDURE — 99245 OFF/OP CONSLTJ NEW/EST HI 55: CPT | Mod: S$GLB,,, | Performed by: INTERNAL MEDICINE

## 2018-12-04 PROCEDURE — 99999 PR PBB SHADOW E&M-EST. PATIENT-LVL III: CPT | Mod: PBBFAC,,, | Performed by: INTERNAL MEDICINE

## 2018-12-04 NOTE — LETTER
December 10, 2018      Yeison Lei MD  [address] [place] [address]           [facility]  [address] [place] [address]  Phone: [phone]  Fax: [phone]          Patient: Eunice Colorado   MR Number: [id number]   YOB: 1970   Date of Visit: 12/4/2018       Dear Dr. Yeison Lei:    Thank you for referring Eunice Colorado to me for evaluation. Attached you will find relevant portions of my assessment and plan of care.    If you have questions, please do not hesitate to call me. I look forward to following Eunice Colorado along with you.    Sincerely,    Toby Morrow MD    Enclosure  CC:  No Recipients    If you would like to receive this communication electronically, please contact [email or web address] or [phone] to request more information on [employer] access.    For providers and/or their staff who would like to refer a patient to [facility], please contact us through our one-stop-shop provider referral line, [facility], at [phone].    If you feel you have received this communication in error or would no longer like to receive these types of communications, please e-mail [email or web address]

## 2018-12-04 NOTE — PROGRESS NOTES
Subjective:   Patient ID:  Eunice Colorado is a 48 y.o. female who presents for follow-up of Chest Pain and Hyperlipidemia  Pt with CP and SOB at rest or exercise. Sx last a few minutes relieved with rest.  CRF- smoker, HLP, family hx of CAD  Hyperlipidemia   This is a chronic problem. The current episode started more than 1 year ago. The problem is controlled. Recent lipid tests were reviewed and are variable. Associated symptoms include chest pain and shortness of breath. Current antihyperlipidemic treatment includes statins. The current treatment provides moderate improvement of lipids. Compliance problems include adherence to exercise.    Chest Pain    This is a new problem. The current episode started 1 to 4 weeks ago. The onset quality is gradual. The problem occurs intermittently. The problem has been waxing and waning. The pain is present in the lateral region. The pain is mild. The quality of the pain is described as dull. The pain does not radiate. Associated symptoms include shortness of breath. Pertinent negatives include no dizziness or palpitations. The pain is aggravated by nothing. She has tried rest for the symptoms. The treatment provided moderate relief.   Her past medical history is significant for hyperlipidemia.   Pertinent negatives for past medical history include no muscle weakness.   Shortness of Breath   This is a recurrent problem. The current episode started 1 to 4 weeks ago. The problem occurs intermittently. The problem has been waxing and waning. Associated symptoms include chest pain. Pertinent negatives include no leg swelling.       Review of Systems   Constitution: Negative. Negative for weight gain.   HENT: Negative.    Eyes: Negative.    Cardiovascular: Positive for chest pain. Negative for leg swelling and palpitations.   Respiratory: Positive for shortness of breath.    Endocrine: Negative.    Hematologic/Lymphatic: Negative.    Skin: Negative.    Musculoskeletal: Negative  for muscle weakness.   Gastrointestinal: Negative.    Genitourinary: Negative.    Neurological: Negative.  Negative for dizziness.   Psychiatric/Behavioral: Negative.    Allergic/Immunologic: Negative.      Family History   Problem Relation Age of Onset    Heart disease Father     Breast cancer Maternal Grandmother     Diabetes Paternal Grandmother     Heart disease Paternal Grandfather     Breast cancer Maternal Aunt     Breast cancer Maternal Cousin     Colon cancer Neg Hx      Past Medical History:   Diagnosis Date    Anxiety     Depression     GERD (gastroesophageal reflux disease)     Hyperlipidemia      Current Outpatient Medications on File Prior to Visit   Medication Sig Dispense Refill    ALPRAZolam (XANAX) 0.5 MG tablet Take 1 tablet by mouth 2 (two) times daily as needed.  2    atorvastatin (LIPITOR) 20 MG tablet Take 1 tablet by mouth once daily.  2    cholecalciferol, vitamin D3, 50,000 unit capsule Take 1 capsule by mouth every 7 days.  1    esomeprazole (NEXIUM) 40 MG capsule Take 1 capsule by mouth once daily.  2    mupirocin (BACTROBAN) 2 % ointment APPLY TOPICALLY TO THE AFFECTED AREA TWICE DAILY 22 g 0    ondansetron (ZOFRAN-ODT) 8 MG TbDL Take 1 tablet (8 mg total) by mouth every 8 (eight) hours as needed. 15 tablet 0    venlafaxine (EFFEXOR-XR) 37.5 MG 24 hr capsule Take 1 capsule (37.5 mg total) by mouth once daily. 30 capsule 11     No current facility-administered medications on file prior to visit.      Review of patient's allergies indicates:   Allergen Reactions    Lortab [hydrocodone-acetaminophen] Itching    Morphine Itching       Objective:     Physical Exam   Constitutional: She is oriented to person, place, and time. She appears well-developed and well-nourished.   HENT:   Head: Normocephalic and atraumatic.   Eyes: Conjunctivae and EOM are normal. Pupils are equal, round, and reactive to light.   Neck: Normal range of motion. Neck supple.   Cardiovascular: Normal  rate, regular rhythm, normal heart sounds and intact distal pulses.   Pulmonary/Chest: Effort normal and breath sounds normal.   Abdominal: Soft. Bowel sounds are normal.   Musculoskeletal: Normal range of motion.   Neurological: She is alert and oriented to person, place, and time.   Skin: Skin is warm and dry.   Psychiatric: She has a normal mood and affect.   Nursing note and vitals reviewed.      Assessment:     1. Pure hypercholesterolemia    2. Chest pain, moderate coronary artery risk    3. Family history of arteriosclerotic cardiovascular disease    4. Smoker    5. SOB (shortness of breath)        Plan:     Pure hypercholesterolemia    Chest pain, moderate coronary artery risk    Family history of arteriosclerotic cardiovascular disease    Smoker    SOB (shortness of breath)    stress test and echo  Continue statin-hlp  Smoking cessation

## 2019-01-24 ENCOUNTER — PATIENT MESSAGE (OUTPATIENT)
Dept: INTERNAL MEDICINE | Facility: CLINIC | Age: 49
End: 2019-01-24

## 2019-01-24 RX ORDER — ALPRAZOLAM 0.5 MG/1
0.5 TABLET ORAL 2 TIMES DAILY PRN
Qty: 30 TABLET | Refills: 2 | Status: SHIPPED | OUTPATIENT
Start: 2019-01-24 | End: 2019-12-03

## 2019-01-24 RX ORDER — VALACYCLOVIR HYDROCHLORIDE 1 G/1
2000 TABLET, FILM COATED ORAL EVERY 12 HOURS
Qty: 12 TABLET | Refills: 1 | Status: SHIPPED | OUTPATIENT
Start: 2019-01-24 | End: 2020-03-16

## 2019-05-24 ENCOUNTER — PATIENT MESSAGE (OUTPATIENT)
Dept: INTERNAL MEDICINE | Facility: CLINIC | Age: 49
End: 2019-05-24

## 2019-05-24 RX ORDER — ONDANSETRON 8 MG/1
8 TABLET, ORALLY DISINTEGRATING ORAL EVERY 8 HOURS PRN
Qty: 15 TABLET | Refills: 0 | Status: ON HOLD | OUTPATIENT
Start: 2019-05-24 | End: 2019-11-02 | Stop reason: HOSPADM

## 2019-10-21 DIAGNOSIS — R07.9 CHEST PAIN, MODERATE CORONARY ARTERY RISK: Primary | ICD-10-CM

## 2019-10-24 ENCOUNTER — OFFICE VISIT (OUTPATIENT)
Dept: CARDIOLOGY | Facility: CLINIC | Age: 49
End: 2019-10-24
Payer: COMMERCIAL

## 2019-10-24 ENCOUNTER — CLINICAL SUPPORT (OUTPATIENT)
Dept: CARDIOLOGY | Facility: CLINIC | Age: 49
End: 2019-10-24
Payer: COMMERCIAL

## 2019-10-24 ENCOUNTER — HOSPITAL ENCOUNTER (OUTPATIENT)
Dept: RADIOLOGY | Facility: HOSPITAL | Age: 49
Discharge: HOME | End: 2019-10-24
Attending: INTERNAL MEDICINE
Payer: COMMERCIAL

## 2019-10-24 VITALS
HEIGHT: 64 IN | HEART RATE: 70 BPM | WEIGHT: 163.38 LBS | OXYGEN SATURATION: 97 % | DIASTOLIC BLOOD PRESSURE: 82 MMHG | SYSTOLIC BLOOD PRESSURE: 122 MMHG | BODY MASS INDEX: 27.89 KG/M2

## 2019-10-24 DIAGNOSIS — Z82.49 FAMILY HISTORY OF ARTERIOSCLEROTIC CARDIOVASCULAR DISEASE: ICD-10-CM

## 2019-10-24 DIAGNOSIS — F17.200 SMOKER: ICD-10-CM

## 2019-10-24 DIAGNOSIS — R07.9 CHEST PAIN, MODERATE CORONARY ARTERY RISK: ICD-10-CM

## 2019-10-24 DIAGNOSIS — R06.02 SOB (SHORTNESS OF BREATH): ICD-10-CM

## 2019-10-24 DIAGNOSIS — R07.9 CHEST PAIN, MODERATE CORONARY ARTERY RISK: Primary | ICD-10-CM

## 2019-10-24 DIAGNOSIS — I20.9 ANGINA PECTORIS: ICD-10-CM

## 2019-10-24 DIAGNOSIS — E78.00 PURE HYPERCHOLESTEROLEMIA: ICD-10-CM

## 2019-10-24 DIAGNOSIS — I20.9 ANGINA PECTORIS: Primary | ICD-10-CM

## 2019-10-24 PROCEDURE — 93000 EKG 12-LEAD: ICD-10-PCS | Mod: S$GLB,,, | Performed by: INTERNAL MEDICINE

## 2019-10-24 PROCEDURE — 99999 PR PBB SHADOW E&M-EST. PATIENT-LVL III: ICD-10-PCS | Mod: PBBFAC,,, | Performed by: INTERNAL MEDICINE

## 2019-10-24 PROCEDURE — 71046 X-RAY EXAM CHEST 2 VIEWS: CPT | Mod: 26,,, | Performed by: RADIOLOGY

## 2019-10-24 PROCEDURE — 3008F BODY MASS INDEX DOCD: CPT | Mod: CPTII,S$GLB,, | Performed by: INTERNAL MEDICINE

## 2019-10-24 PROCEDURE — 71046 XR CHEST PA AND LATERAL: ICD-10-PCS | Mod: 26,,, | Performed by: RADIOLOGY

## 2019-10-24 PROCEDURE — 99215 OFFICE O/P EST HI 40 MIN: CPT | Mod: 25,S$GLB,, | Performed by: INTERNAL MEDICINE

## 2019-10-24 PROCEDURE — 99215 PR OFFICE/OUTPT VISIT, EST, LEVL V, 40-54 MIN: ICD-10-PCS | Mod: 25,S$GLB,, | Performed by: INTERNAL MEDICINE

## 2019-10-24 PROCEDURE — 99999 PR PBB SHADOW E&M-EST. PATIENT-LVL III: CPT | Mod: PBBFAC,,, | Performed by: INTERNAL MEDICINE

## 2019-10-24 PROCEDURE — 71046 X-RAY EXAM CHEST 2 VIEWS: CPT | Mod: TC,FY,PO

## 2019-10-24 PROCEDURE — 3008F PR BODY MASS INDEX (BMI) DOCUMENTED: ICD-10-PCS | Mod: CPTII,S$GLB,, | Performed by: INTERNAL MEDICINE

## 2019-10-24 PROCEDURE — 93000 ELECTROCARDIOGRAM COMPLETE: CPT | Mod: S$GLB,,, | Performed by: INTERNAL MEDICINE

## 2019-10-24 RX ORDER — ISOSORBIDE MONONITRATE 30 MG/1
30 TABLET, EXTENDED RELEASE ORAL DAILY
Qty: 30 TABLET | Refills: 11 | Status: ON HOLD | OUTPATIENT
Start: 2019-10-24 | End: 2019-11-08 | Stop reason: HOSPADM

## 2019-10-24 RX ORDER — ASPIRIN 325 MG/1
325 TABLET, FILM COATED ORAL DAILY
Refills: 0 | Status: ON HOLD | COMMUNITY
Start: 2019-10-24 | End: 2019-11-02 | Stop reason: HOSPADM

## 2019-10-24 RX ORDER — ATORVASTATIN CALCIUM 20 MG/1
20 TABLET, FILM COATED ORAL DAILY
Qty: 30 TABLET | Refills: 11 | Status: SHIPPED | OUTPATIENT
Start: 2019-10-24 | End: 2020-03-16

## 2019-10-24 NOTE — PROGRESS NOTES
Subjective:   Patient ID:  Eunice Talavera is a 49 y.o. female who presents for follow-up of Consult (having tightness in her chest when she walks, stress )    Pt with CP and SOB at rest or exercise, pain has been getting worse since last visit, still does not occur at rest. Was not able to get stress test or echo due to insurance reasons.   CRF- smoker, HLP, family hx of CAD    Hyperlipidemia   This is a chronic problem. The current episode started more than 1 year ago. The problem is controlled. Recent lipid tests were reviewed and are variable. Associated symptoms include chest pain and shortness of breath. Current antihyperlipidemic treatment includes statins. The current treatment provides moderate improvement of lipids. Compliance problems include adherence to exercise.    Chest Pain    This is a new problem. The current episode started 10 months ago. The onset quality is gradual. The problem occurs intermittently. The problem has been waxing and waning. The pain is present in the lateral region. The pain is mild. The quality of the pain is described as dull. The pain does not radiate. Associated symptoms include shortness of breath. Pertinent negatives include no dizziness or palpitations. The pain is aggravated by nothing. She has tried rest for the symptoms. The treatment provided moderate relief.   Her past medical history is significant for hyperlipidemia.   Pertinent negatives for past medical history include no muscle weakness.   Shortness of Breath   This is a recurrent problem. The current episode started 10 months ago. The problem occurs intermittently. The problem has been waxing and waning. Associated symptoms include chest pain. Pertinent negatives include no leg swelling.     Review of Systems   Constitution: Positive for malaise/fatigue. Negative for weight gain.   HENT: Negative.    Eyes: Positive for blurred vision.   Cardiovascular: Positive for chest pain, dyspnea on exertion and leg  swelling. Negative for claudication, cyanosis, irregular heartbeat, near-syncope, orthopnea, palpitations, paroxysmal nocturnal dyspnea and syncope.   Respiratory: Positive for shortness of breath.    Endocrine: Negative.    Hematologic/Lymphatic: Negative.    Skin: Negative.    Musculoskeletal: Negative for muscle weakness.   Gastrointestinal: Negative.    Genitourinary: Negative.    Neurological: Negative.  Negative for dizziness.   Psychiatric/Behavioral: Negative.    Allergic/Immunologic: Negative.      Family History   Problem Relation Age of Onset    Heart disease Father     Breast cancer Maternal Grandmother     Diabetes Paternal Grandmother     Heart disease Paternal Grandfather     Breast cancer Maternal Aunt     Breast cancer Maternal Cousin     Colon cancer Neg Hx      Past Medical History:   Diagnosis Date    Anxiety     Depression     GERD (gastroesophageal reflux disease)     Hyperlipidemia      Social History     Socioeconomic History    Marital status: Single     Spouse name: Not on file    Number of children: 2    Years of education: Not on file    Highest education level: Not on file   Occupational History    Occupation: Manager   Social Needs    Financial resource strain: Not on file    Food insecurity:     Worry: Not on file     Inability: Not on file    Transportation needs:     Medical: Not on file     Non-medical: Not on file   Tobacco Use    Smoking status: Current Every Day Smoker     Packs/day: 1.00     Years: 20.00     Pack years: 20.00     Types: Cigarettes    Smokeless tobacco: Never Used   Substance and Sexual Activity    Alcohol use: Yes     Comment: occasional    Drug use: No    Sexual activity: Yes     Partners: Male   Lifestyle    Physical activity:     Days per week: Not on file     Minutes per session: Not on file    Stress: Not on file   Relationships    Social connections:     Talks on phone: Not on file     Gets together: Not on file     Attends  [employer] service: Not on file     Active member of club or organization: Not on file     Attends meetings of clubs or organizations: Not on file     Relationship status: Not on file   Other Topics Concern    Not on file   Social History Narrative    Not on file     Current Outpatient Medications on File Prior to Visit   Medication Sig Dispense Refill    ALPRAZolam (XANAX) 0.5 MG tablet Take 1 tablet (0.5 mg total) by mouth 2 (two) times daily as needed for Anxiety. 30 tablet 2    esomeprazole (NEXIUM) 40 MG capsule Take 1 capsule by mouth once daily.  2    valACYclovir (VALTREX) 1000 MG tablet Take 2 tablets (2,000 mg total) by mouth every 12 (twelve) hours. for 2 doses 12 tablet 1    atorvastatin (LIPITOR) 20 MG tablet Take 1 tablet by mouth once daily.  2    cholecalciferol, vitamin D3, 50,000 unit capsule Take 1 capsule by mouth every 7 days.  1    mupirocin (BACTROBAN) 2 % ointment APPLY TOPICALLY TO THE AFFECTED AREA TWICE DAILY (Patient not taking: Reported on 10/24/2019) 22 g 0    ondansetron (ZOFRAN-ODT) 8 MG TbDL Take 1 tablet (8 mg total) by mouth every 8 (eight) hours as needed. (Patient not taking: Reported on 10/24/2019) 15 tablet 0    venlafaxine (EFFEXOR-XR) 37.5 MG 24 hr capsule Take 1 capsule (37.5 mg total) by mouth once daily. (Patient not taking: Reported on 10/24/2019) 30 capsule 11     No current facility-administered medications on file prior to visit.      Review of patient's allergies indicates:   Allergen Reactions    Lortab [hydrocodone-acetaminophen] Itching    Morphine Itching       Objective:     Physical Exam   Constitutional: She is oriented to person, place, and time. She appears well-developed and well-nourished.   HENT:   Head: Normocephalic and atraumatic.   Eyes: Pupils are equal, round, and reactive to light. Conjunctivae and EOM are normal.   Neck: Normal range of motion. Neck supple. No JVD present.   Cardiovascular: Normal rate, regular rhythm, normal heart  sounds and intact distal pulses. Exam reveals no gallop and no friction rub.   No murmur heard.  Pulmonary/Chest: Effort normal and breath sounds normal. No respiratory distress. She has no wheezes. She has no rales.   Abdominal: Soft. Bowel sounds are normal.   Musculoskeletal: Normal range of motion.   Lymphadenopathy:     She has no cervical adenopathy.   Neurological: She is alert and oriented to person, place, and time.   Skin: Skin is warm and dry.   Psychiatric: She has a normal mood and affect.   Nursing note and vitals reviewed.      Assessment:     1. Chest pain, moderate coronary artery risk    2. Pure hypercholesterolemia    3. Family history of arteriosclerotic cardiovascular disease    4. Smoker    5. SOB (shortness of breath)    cresciendo angina    Plan:     Chest pain, moderate coronary artery risk    Pure hypercholesterolemia    Family history of arteriosclerotic cardiovascular disease    Smoker    SOB (shortness of breath)      stress test and echo  Continue statin-hlp  Smoking cessation

## 2019-10-25 ENCOUNTER — TELEPHONE (OUTPATIENT)
Dept: CARDIOLOGY | Facility: CLINIC | Age: 49
End: 2019-10-25

## 2019-10-25 DIAGNOSIS — F17.200 SMOKER: Primary | ICD-10-CM

## 2019-10-25 DIAGNOSIS — I20.89 OTHER FORMS OF ANGINA PECTORIS: ICD-10-CM

## 2019-10-25 NOTE — TELEPHONE ENCOUNTER
Received call from patient with complaint of headache from Imdur, funny feeling in chest, wanting to know if using anti smokiing patch would be better than Chantix.  Advised try Tylenol and give 24-48 hrs for relief from headache induced by Imdur  Will consult Smoking Cessation program and check with Dr. Morrow for Nicoderm patch vs Chantix  If chest discomfort persists report to ER but will check with Dr. Morrow if he'd want to do heart cath sooner than Friday      Dr. Morrow- please advise

## 2019-10-28 RX ORDER — VARENICLINE TARTRATE 0.5 (11)-1
KIT ORAL
Qty: 1 PACKAGE | Refills: 0 | Status: CANCELLED | OUTPATIENT
Start: 2019-10-28

## 2019-10-28 RX ORDER — IBUPROFEN 200 MG
1 TABLET ORAL DAILY
Refills: 0 | Status: CANCELLED | COMMUNITY
Start: 2019-10-28

## 2019-10-29 RX ORDER — IBUPROFEN 200 MG
1 TABLET ORAL
Status: ON HOLD | COMMUNITY
End: 2019-11-02 | Stop reason: HOSPADM

## 2019-10-29 RX ORDER — VARENICLINE TARTRATE 0.5 (11)-1
1 KIT ORAL DAILY
Status: ON HOLD | COMMUNITY
End: 2019-11-02 | Stop reason: HOSPADM

## 2019-10-31 ENCOUNTER — TELEPHONE (OUTPATIENT)
Dept: CARDIOLOGY | Facility: CLINIC | Age: 49
End: 2019-10-31

## 2019-10-31 NOTE — TELEPHONE ENCOUNTER
Pt called and rev'd all pre cath instructions , location, time of arrival and the possibility of staying over night if intervention done. All questions answered and instructed to rtc if needed. Ronaldo

## 2019-11-01 ENCOUNTER — HOSPITAL ENCOUNTER (OUTPATIENT)
Facility: HOSPITAL | Age: 49
Discharge: HOME | End: 2019-11-02
Attending: INTERNAL MEDICINE | Admitting: INTERNAL MEDICINE
Payer: COMMERCIAL

## 2019-11-01 DIAGNOSIS — Z82.49 FAMILY HISTORY OF ARTERIOSCLEROTIC CARDIOVASCULAR DISEASE: ICD-10-CM

## 2019-11-01 DIAGNOSIS — R06.02 SHORTNESS OF BREATH: ICD-10-CM

## 2019-11-01 DIAGNOSIS — I20.9 ANGINA PECTORIS: Primary | ICD-10-CM

## 2019-11-01 DIAGNOSIS — R07.9 CHEST PAIN: ICD-10-CM

## 2019-11-01 DIAGNOSIS — Z95.5 S/P CORONARY ARTERY STENT PLACEMENT: ICD-10-CM

## 2019-11-01 DIAGNOSIS — I25.10 CAD (CORONARY ARTERY DISEASE): ICD-10-CM

## 2019-11-01 DIAGNOSIS — I20.9 ANGINA PECTORIS, UNSPECIFIED: ICD-10-CM

## 2019-11-01 LAB
POC ACTIVATED CLOTTING TIME K: 235 SEC (ref 74–137)
SAMPLE: ABNORMAL

## 2019-11-01 PROCEDURE — 21400001 HC TELEMETRY ROOM

## 2019-11-01 PROCEDURE — 36415 COLL VENOUS BLD VENIPUNCTURE: CPT

## 2019-11-01 PROCEDURE — 63600175 PHARM REV CODE 636 W HCPCS

## 2019-11-01 PROCEDURE — 92928 PRQ TCAT PLMT NTRAC ST 1 LES: CPT | Mod: RC,,, | Performed by: INTERNAL MEDICINE

## 2019-11-01 PROCEDURE — 99152 CATH LAB PROCEDURE: ICD-10-PCS | Mod: ,,, | Performed by: INTERNAL MEDICINE

## 2019-11-01 PROCEDURE — 25000003 PHARM REV CODE 250: Performed by: INTERNAL MEDICINE

## 2019-11-01 PROCEDURE — 99152 MOD SED SAME PHYS/QHP 5/>YRS: CPT | Mod: ,,, | Performed by: INTERNAL MEDICINE

## 2019-11-01 PROCEDURE — 25000003 PHARM REV CODE 250

## 2019-11-01 PROCEDURE — C1769 GUIDE WIRE: HCPCS

## 2019-11-01 PROCEDURE — 93458 CATH LAB PROCEDURE: ICD-10-PCS | Mod: 26,59,, | Performed by: INTERNAL MEDICINE

## 2019-11-01 PROCEDURE — 93458 L HRT ARTERY/VENTRICLE ANGIO: CPT | Mod: 26,59,, | Performed by: INTERNAL MEDICINE

## 2019-11-01 PROCEDURE — 92928 CATH LAB PROCEDURE: ICD-10-PCS | Mod: RC,,, | Performed by: INTERNAL MEDICINE

## 2019-11-01 PROCEDURE — 85025 COMPLETE CBC W/AUTO DIFF WBC: CPT

## 2019-11-01 PROCEDURE — 85347 COAGULATION TIME ACTIVATED: CPT

## 2019-11-01 PROCEDURE — S4991 NICOTINE PATCH NONLEGEND: HCPCS | Performed by: INTERNAL MEDICINE

## 2019-11-01 RX ORDER — PANTOPRAZOLE SODIUM 40 MG/1
40 TABLET, DELAYED RELEASE ORAL DAILY
Status: DISCONTINUED | OUTPATIENT
Start: 2019-11-02 | End: 2019-11-02 | Stop reason: HOSPADM

## 2019-11-01 RX ORDER — ALPRAZOLAM 0.5 MG/1
0.5 TABLET ORAL 2 TIMES DAILY PRN
Status: DISCONTINUED | OUTPATIENT
Start: 2019-11-01 | End: 2019-11-02 | Stop reason: HOSPADM

## 2019-11-01 RX ORDER — ISOSORBIDE MONONITRATE 30 MG/1
30 TABLET, EXTENDED RELEASE ORAL DAILY
Status: DISCONTINUED | OUTPATIENT
Start: 2019-11-02 | End: 2019-11-02 | Stop reason: HOSPADM

## 2019-11-01 RX ORDER — IBUPROFEN 200 MG
1 TABLET ORAL DAILY
Status: DISCONTINUED | OUTPATIENT
Start: 2019-11-01 | End: 2019-11-02 | Stop reason: HOSPADM

## 2019-11-01 RX ORDER — ASPIRIN 325 MG
325 TABLET ORAL ONCE
Status: COMPLETED | OUTPATIENT
Start: 2019-11-01 | End: 2019-11-01

## 2019-11-01 RX ORDER — SODIUM CHLORIDE 9 MG/ML
INJECTION, SOLUTION INTRAVENOUS CONTINUOUS
Status: DISCONTINUED | OUTPATIENT
Start: 2019-11-01 | End: 2019-11-01

## 2019-11-01 RX ORDER — ONDANSETRON 8 MG/1
8 TABLET, ORALLY DISINTEGRATING ORAL EVERY 12 HOURS PRN
Status: DISCONTINUED | OUTPATIENT
Start: 2019-11-01 | End: 2019-11-02 | Stop reason: HOSPADM

## 2019-11-01 RX ORDER — ATROPINE SULFATE 0.1 MG/ML
0.5 INJECTION INTRAVENOUS
Status: DISCONTINUED | OUTPATIENT
Start: 2019-11-01 | End: 2019-11-01

## 2019-11-01 RX ORDER — ATORVASTATIN CALCIUM 10 MG/1
20 TABLET, FILM COATED ORAL DAILY
Status: DISCONTINUED | OUTPATIENT
Start: 2019-11-02 | End: 2019-11-01 | Stop reason: SDUPTHER

## 2019-11-01 RX ORDER — DIAZEPAM 5 MG/1
5 TABLET ORAL
Status: DISCONTINUED | OUTPATIENT
Start: 2019-11-01 | End: 2019-11-01 | Stop reason: HOSPADM

## 2019-11-01 RX ORDER — DIPHENHYDRAMINE HCL 50 MG
50 CAPSULE ORAL ONCE
Status: COMPLETED | OUTPATIENT
Start: 2019-11-01 | End: 2019-11-01

## 2019-11-01 RX ORDER — IBUPROFEN 200 MG
1 TABLET ORAL
Status: DISCONTINUED | OUTPATIENT
Start: 2019-11-01 | End: 2019-11-01 | Stop reason: SDUPTHER

## 2019-11-01 RX ORDER — ATORVASTATIN CALCIUM 10 MG/1
20 TABLET, FILM COATED ORAL NIGHTLY
Status: DISCONTINUED | OUTPATIENT
Start: 2019-11-01 | End: 2019-11-02 | Stop reason: HOSPADM

## 2019-11-01 RX ORDER — NITROGLYCERIN 0.4 MG/1
0.4 TABLET SUBLINGUAL EVERY 5 MIN PRN
Status: DISCONTINUED | OUTPATIENT
Start: 2019-11-01 | End: 2019-11-01

## 2019-11-01 RX ORDER — CLOPIDOGREL BISULFATE 75 MG/1
75 TABLET ORAL DAILY
Status: DISCONTINUED | OUTPATIENT
Start: 2019-11-01 | End: 2019-11-01

## 2019-11-01 RX ADMIN — DIAZEPAM 5 MG: 5 TABLET ORAL at 09:11

## 2019-11-01 RX ADMIN — ALPRAZOLAM 0.5 MG: 0.5 TABLET ORAL at 06:11

## 2019-11-01 RX ADMIN — ASPIRIN 325 MG ORAL TABLET 325 MG: 325 PILL ORAL at 04:11

## 2019-11-01 RX ADMIN — SODIUM CHLORIDE: 9 INJECTION, SOLUTION INTRAVENOUS at 09:11

## 2019-11-01 RX ADMIN — ATORVASTATIN CALCIUM 20 MG: 10 TABLET, FILM COATED ORAL at 08:11

## 2019-11-01 RX ADMIN — Medication 50 MG: at 09:11

## 2019-11-01 RX ADMIN — NICOTINE 1 PATCH: 21 PATCH TRANSDERMAL at 04:11

## 2019-11-01 NOTE — BRIEF OP NOTE
<[facility]  Surgery Department  Operative Note    SUMMARY     Date of Procedure: 11/1/2019     Procedure: Procedure(s) (LRB):  CATHETERIZATION, HEART, LEFT (Left)     Surgeon(s) and Role:     * Toby Morrow MD - Primary    Assisting Surgeon: None    Pre-Operative Diagnosis: Angina pectoris [I20.9]  SOB (shortness of breath) [R06.02]    Post-Operative Diagnosis: Post-Op Diagnosis Codes:     * Angina pectoris [I20.9]     * SOB (shortness of breath) [R06.02]    Anesthesia: RN IV Sedation    Technical Procedures Used: [facility]    Description of the Findings of the Procedure: C, DX; ANGINA PECTORIS, FINDINGS: MID RCA, 70-90%, PCI WITH DESIRE TO 0%, NML LV FUNCTION    Significant Surgical Tasks Conducted by the Assistant(s), if Applicable: NONE    Complications: No    Estimated Blood Loss (EBL): < 50 cc           Implants: * No implants in log *    Specimens:   Specimen (12h ago, onward)    None                  Condition: Good    Disposition: PACU - hemodynamically stable.    Attestation: I was present and scrubbed for the entire procedure.

## 2019-11-01 NOTE — H&P
Patient ID:  Eunice Talavera is a 49 y.o. female who presents for follow-up of Consult (having tightness in her chest when she walks, stress )     Pt with CP and SOB at rest or exercise, pain has been getting worse since last visit, still does not occur at rest. Was not able to get stress test or echo due to insurance reasons.   CRF- smoker, HLP, family hx of CAD     Hyperlipidemia   This is a chronic problem. The current episode started more than 1 year ago. The problem is controlled. Recent lipid tests were reviewed and are variable. Associated symptoms include chest pain and shortness of breath. Current antihyperlipidemic treatment includes statins. The current treatment provides moderate improvement of lipids. Compliance problems include adherence to exercise.    Chest Pain    This is a new problem. The current episode started 10 months ago. The onset quality is gradual. The problem occurs intermittently. The problem has been waxing and waning. The pain is present in the lateral region. The pain is mild. The quality of the pain is described as dull. The pain does not radiate. Associated symptoms include shortness of breath. Pertinent negatives include no dizziness or palpitations. The pain is aggravated by nothing. She has tried rest for the symptoms. The treatment provided moderate relief.   Her past medical history is significant for hyperlipidemia.   Pertinent negatives for past medical history include no muscle weakness.   Shortness of Breath   This is a recurrent problem. The current episode started 10 months ago. The problem occurs intermittently. The problem has been waxing and waning. Associated symptoms include chest pain. Pertinent negatives include no leg swelling.      Review of Systems   Constitution: Positive for malaise/fatigue. Negative for weight gain.   HENT: Negative.    Eyes: Positive for blurred vision.   Cardiovascular: Positive for chest pain, dyspnea on exertion and leg swelling.  Negative for claudication, cyanosis, irregular heartbeat, near-syncope, orthopnea, palpitations, paroxysmal nocturnal dyspnea and syncope.   Respiratory: Positive for shortness of breath.    Endocrine: Negative.    Hematologic/Lymphatic: Negative.    Skin: Negative.    Musculoskeletal: Negative for muscle weakness.   Gastrointestinal: Negative.    Genitourinary: Negative.    Neurological: Negative.  Negative for dizziness.   Psychiatric/Behavioral: Negative.    Allergic/Immunologic: Negative.             Family History   Problem Relation Age of Onset    Heart disease Father      Breast cancer Maternal Grandmother      Diabetes Paternal Grandmother      Heart disease Paternal Grandfather      Breast cancer Maternal Aunt      Breast cancer Maternal Cousin      Colon cancer Neg Hx        Past Medical and Surgical History:   Diagnosis Date    Anxiety      Depression      GERD (gastroesophageal reflux disease)      Hyperlipidemia        Social History            Socioeconomic History    Marital status: Single       Spouse name: Not on file    Number of children: 2    Years of education: Not on file    Highest education level: Not on file   Occupational History    Occupation: Manager   Social Needs    Financial resource strain: Not on file    Food insecurity:       Worry: Not on file       Inability: Not on file    Transportation needs:       Medical: Not on file       Non-medical: Not on file   Tobacco Use    Smoking status: Current Every Day Smoker       Packs/day: 1.00       Years: 20.00       Pack years: 20.00       Types: Cigarettes    Smokeless tobacco: Never Used   Substance and Sexual Activity    Alcohol use: Yes       Comment: occasional    Drug use: No    Sexual activity: Yes       Partners: Male   Lifestyle    Physical activity:       Days per week: Not on file       Minutes per session: Not on file    Stress: Not on file   Relationships    Social connections:       Talks on phone:  Not on file       Gets together: Not on file       Attends Gnosticism service: Not on file       Active member of club or organization: Not on file       Attends meetings of clubs or organizations: Not on file       Relationship status: Not on file   Other Topics Concern    Not on file   Social History Narrative    Not on file             Current Outpatient Medications on File Prior to Visit   Medication Sig Dispense Refill    ALPRAZolam (XANAX) 0.5 MG tablet Take 1 tablet (0.5 mg total) by mouth 2 (two) times daily as needed for Anxiety. 30 tablet 2    esomeprazole (NEXIUM) 40 MG capsule Take 1 capsule by mouth once daily.   2    valACYclovir (VALTREX) 1000 MG tablet Take 2 tablets (2,000 mg total) by mouth every 12 (twelve) hours. for 2 doses 12 tablet 1    atorvastatin (LIPITOR) 20 MG tablet Take 1 tablet by mouth once daily.   2    cholecalciferol, vitamin D3, 50,000 unit capsule Take 1 capsule by mouth every 7 days.   1    mupirocin (BACTROBAN) 2 % ointment APPLY TOPICALLY TO THE AFFECTED AREA TWICE DAILY (Patient not taking: Reported on 10/24/2019) 22 g 0    ondansetron (ZOFRAN-ODT) 8 MG TbDL Take 1 tablet (8 mg total) by mouth every 8 (eight) hours as needed. (Patient not taking: Reported on 10/24/2019) 15 tablet 0    venlafaxine (EFFEXOR-XR) 37.5 MG 24 hr capsule Take 1 capsule (37.5 mg total) by mouth once daily. (Patient not taking: Reported on 10/24/2019) 30 capsule 11      No current facility-administered medications on file prior to visit.            Review of patient's allergies indicates:   Allergen Reactions    Lortab [hydrocodone-acetaminophen] Itching    Morphine Itching         Objective:      Physical Exam   Constitutional: She is oriented to person, place, and time. She appears well-developed and well-nourished.   HENT:   Head: Normocephalic and atraumatic.   Eyes: Pupils are equal, round, and reactive to light. Conjunctivae and EOM are normal.   Neck: Normal range of motion. Neck  supple. No JVD present.   Cardiovascular: Normal rate, regular rhythm, normal heart sounds and intact distal pulses. Exam reveals no gallop and no friction rub.   No murmur heard.  Pulmonary/Chest: Effort normal and breath sounds normal. No respiratory distress. She has no wheezes. She has no rales.   Abdominal: Soft. Bowel sounds are normal.   Musculoskeletal: Normal range of motion.   Lymphadenopathy:     She has no cervical adenopathy.   Neurological: She is alert and oriented to person, place, and time.   Skin: Skin is warm and dry.   Psychiatric: She has a normal mood and affect.   Nursing note and vitals reviewed.        Assessment:      1. Chest pain, moderate coronary artery risk    2. Pure hypercholesterolemia    3. Family history of arteriosclerotic cardiovascular disease    4. Smoker    5. SOB (shortness of breath)    cresciendo angina     Plan:      Chest pain, moderate coronary artery risk     Pure hypercholesterolemia     Family history of arteriosclerotic cardiovascular disease     Smoker     SOB (shortness of breath)        [facility]  Risks and benefits explained

## 2019-11-01 NOTE — PROGRESS NOTES
Clinical Pharmacy Progress Note: Medication Education     Patient was counseled by pharmacist on new medication nicotine transdermal patch and its indications, side effects, and reasons for taking this medication.   Patient was given educational drug card/handout.   Patient/caregiver expressed understanding and had no further questions.  Offered bedside delivery of medications to patient.     Thank you for allowing us to participate in this patient's care.    Dominique Currie, PharmD 11/1/2019 3:27 PM

## 2019-11-01 NOTE — PLAN OF CARE
L TR Band removed and pressure dressing applied. Dressing remains c/d/i and wnl at time of transfer to [facility].   Transferred to [facility], via stretcher, in no apparent distress. Assisted from stretcher to bed without issue.  Bedside report given to Sybil Lehman RN; no further questions at this time.

## 2019-11-02 VITALS
HEART RATE: 61 BPM | SYSTOLIC BLOOD PRESSURE: 101 MMHG | OXYGEN SATURATION: 97 % | RESPIRATION RATE: 18 BRPM | TEMPERATURE: 98 F | DIASTOLIC BLOOD PRESSURE: 65 MMHG | BODY MASS INDEX: 27.83 KG/M2 | WEIGHT: 163 LBS | HEIGHT: 64 IN

## 2019-11-02 PROBLEM — Z95.5 S/P CORONARY ARTERY STENT PLACEMENT: Status: ACTIVE | Noted: 2019-11-02

## 2019-11-02 PROBLEM — I25.118 CORONARY ARTERY DISEASE OF NATIVE ARTERY OF NATIVE HEART WITH STABLE ANGINA PECTORIS: Status: ACTIVE | Noted: 2019-11-02

## 2019-11-02 LAB
BASOPHILS # BLD AUTO: 0.11 K/UL (ref 0–0.2)
BASOPHILS NFR BLD: 1.3 % (ref 0–1.9)
DIFFERENTIAL METHOD: ABNORMAL
EOSINOPHIL # BLD AUTO: 0.3 K/UL (ref 0–0.5)
EOSINOPHIL NFR BLD: 3.9 % (ref 0–8)
ERYTHROCYTE [DISTWIDTH] IN BLOOD BY AUTOMATED COUNT: 13.4 % (ref 11.5–14.5)
HCT VFR BLD AUTO: 40.5 % (ref 37–48.5)
HGB BLD-MCNC: 12.8 G/DL (ref 12–16)
IMM GRANULOCYTES # BLD AUTO: 0.02 K/UL (ref 0–0.04)
IMM GRANULOCYTES NFR BLD AUTO: 0.2 % (ref 0–0.5)
LYMPHOCYTES # BLD AUTO: 3.5 K/UL (ref 1–4.8)
LYMPHOCYTES NFR BLD: 42.4 % (ref 18–48)
MCH RBC QN AUTO: 28.3 PG (ref 27–31)
MCHC RBC AUTO-ENTMCNC: 31.6 G/DL (ref 32–36)
MCV RBC AUTO: 90 FL (ref 82–98)
MONOCYTES # BLD AUTO: 0.6 K/UL (ref 0.3–1)
MONOCYTES NFR BLD: 7.6 % (ref 4–15)
NEUTROPHILS # BLD AUTO: 3.7 K/UL (ref 1.8–7.7)
NEUTROPHILS NFR BLD: 44.6 % (ref 38–73)
NRBC BLD-RTO: 0 /100 WBC
PLATELET # BLD AUTO: 227 K/UL (ref 150–350)
PMV BLD AUTO: 10.9 FL (ref 9.2–12.9)
RBC # BLD AUTO: 4.52 M/UL (ref 4–5.4)
WBC # BLD AUTO: 8.2 K/UL (ref 3.9–12.7)

## 2019-11-02 PROCEDURE — S4991 NICOTINE PATCH NONLEGEND: HCPCS | Performed by: INTERNAL MEDICINE

## 2019-11-02 PROCEDURE — 25000003 PHARM REV CODE 250: Performed by: INTERNAL MEDICINE

## 2019-11-02 PROCEDURE — 99499 NO LOS: ICD-10-PCS | Mod: ,,, | Performed by: INTERNAL MEDICINE

## 2019-11-02 PROCEDURE — 93010 ELECTROCARDIOGRAM REPORT: CPT | Mod: ,,, | Performed by: INTERNAL MEDICINE

## 2019-11-02 PROCEDURE — 99499 UNLISTED E&M SERVICE: CPT | Mod: ,,, | Performed by: INTERNAL MEDICINE

## 2019-11-02 PROCEDURE — 93010 EKG 12-LEAD: ICD-10-PCS | Mod: ,,, | Performed by: INTERNAL MEDICINE

## 2019-11-02 PROCEDURE — 93005 ELECTROCARDIOGRAM TRACING: CPT

## 2019-11-02 PROCEDURE — 25000003 PHARM REV CODE 250: Performed by: PHYSICIAN ASSISTANT

## 2019-11-02 RX ORDER — IBUPROFEN 200 MG
1 TABLET ORAL DAILY
Refills: 0 | COMMUNITY
Start: 2019-11-02 | End: 2019-11-04 | Stop reason: ALTCHOICE

## 2019-11-02 RX ORDER — ASPIRIN 81 MG/1
81 TABLET ORAL DAILY
Status: DISCONTINUED | OUTPATIENT
Start: 2019-11-02 | End: 2019-11-02 | Stop reason: HOSPADM

## 2019-11-02 RX ORDER — CLOPIDOGREL BISULFATE 75 MG/1
75 TABLET ORAL DAILY
Qty: 30 TABLET | Refills: 11 | Status: SHIPPED | OUTPATIENT
Start: 2019-11-03 | End: 2021-01-29

## 2019-11-02 RX ORDER — ASPIRIN 81 MG/1
81 TABLET ORAL DAILY
Refills: 0 | COMMUNITY
Start: 2019-11-03 | End: 2020-03-16

## 2019-11-02 RX ORDER — CLOPIDOGREL BISULFATE 75 MG/1
75 TABLET ORAL DAILY
Status: DISCONTINUED | OUTPATIENT
Start: 2019-11-02 | End: 2019-11-02 | Stop reason: HOSPADM

## 2019-11-02 RX ADMIN — NICOTINE 1 PATCH: 21 PATCH TRANSDERMAL at 09:11

## 2019-11-02 RX ADMIN — CLOPIDOGREL BISULFATE 75 MG: 75 TABLET ORAL at 09:11

## 2019-11-02 RX ADMIN — PANTOPRAZOLE SODIUM 40 MG: 40 TABLET, DELAYED RELEASE ORAL at 09:11

## 2019-11-02 RX ADMIN — ISOSORBIDE MONONITRATE 30 MG: 30 TABLET, EXTENDED RELEASE ORAL at 09:11

## 2019-11-02 RX ADMIN — ASPIRIN 81 MG: 81 TABLET, COATED ORAL at 09:11

## 2019-11-02 NOTE — NURSING
Went over discharge instructions with patient.   Stressed importance of making and keeping all follow ups as well as making prescribed medication changes.   Prescription sent to pt's requested pharmacy.  Emphasized post op heart cath instructions, verbal and written teaching provided.  Patient verbalized understanding and has had all questions in regards to discharge answered to satisfaction.  IV removed without complications.  Telemetry box removed and returned to monitor tech.  Patient instructed to call nurse station once dressed and ready to be discharged via wheelchair to personal transportation.  Primary nurse notified of pt's discharge status.

## 2019-11-02 NOTE — PLAN OF CARE
SW provided pt with written information for medical bracelets/necklaces, per pt's request. Pt advised that due to new rx of Plavix, she would like to have medical identification in the event of an emergency.  AMY Monzon,  11/2/201912:38 PM

## 2019-11-02 NOTE — PLAN OF CARE
11/02/19 1102   Discharge Assessment   Assessment Type Discharge Planning Assessment   Confirmed/corrected address and phone number on facesheet? Yes   Assessment information obtained from? Patient   Communicated expected length of stay with patient/caregiver yes   Prior to hospitilization cognitive status: Alert/Oriented   Prior to hospitalization functional status: Independent   Current cognitive status: Alert/Oriented   Current Functional Status: Independent   Facility Arrived From: Home   Lives With spouse;child(pema), adult   Able to Return to Prior Arrangements yes   Is patient able to care for self after discharge? Yes   Who are your caregiver(s) and their phone number(s)? Saskia Borrego (daughter) [phone], Kevin Joelmelony (spouse) [phone]   Readmission Within the Last 30 Days no previous admission in last 30 days   Patient currently being followed by outpatient case management? No   Patient currently receives any other outside agency services? No   Equipment Currently Used at Home none   Do you have any problems affording any of your prescribed medications? No   Is the patient taking medications as prescribed? yes   Does the patient have transportation home? Yes   Transportation Anticipated family or friend will provide   Does the patient receive services at the [facility]? No   Discharge Plan A Home   Discharge Plan B Home   DME Needed Upon Discharge  none   Patient/Family in Agreement with Plan yes     Met with patient at bedside. Patient is independent with adls and iadls. Patient denies any post hospital needs or services at this time.  Updated white board with 's name and number. Patient will discharge home to prior arrangements; home with spouse and adult daughter. Patient will use [facility] ([address] Transitional Care Folder, Discharge Planning Begins on Admission pamphlet, [facility] Bedside Delivery pamphlet, Advance Directive information  given to patient along with the contact information given.Instructed patient or family to call with any questions or concerns.        D/C Plan: Per pt's report, d/c scheduled for 11/2/2019  PCP: Yeison Lei MD  Preferred Pharmacy: [facility] ([place], [address]  Discharge transportation: Mother Erin [place] (personal vehicle)  [facility]: Active  Pharmacy Bedside Delivery: N/A

## 2019-11-02 NOTE — PLAN OF CARE
Pt had a HRT Cath nd arrived from Cath Lab around 1400  Pt AAO x4.  VSS  Pt able to make needs known.  Pt remained afebrile throughout this shift.   Pt ambulates in room, gait steady   Pt remained free of falls this shift.   Pt denies pain this shift.  Plan of care reviewed. Patient verbalizes understanding.   Pt moving/turing independent. Frequent weight shifting encouraged.  Patient sinus rhythm on monitor.   Bed low, side rails up x 2, wheels locked, call light in reach.   Hourly rounding completed.   Will continue to monitor.

## 2019-11-02 NOTE — DISCHARGE SUMMARY
[facility]  Cardiology  Discharge Summary      Patient Name: Eunice Talavera  MRN: [id number]  Admission Date: 11/1/2019  Hospital Length of Stay: 1 days  Discharge Date and Time:  11/02/2019 11:51 AM  Attending Physician: Toby Morrow MD  Discharging Provider: Sara So PA-C  Primary Care Physician: Yeison Lei MD    HPI: Ms. Griffin is a 49 year old patient with familial history of CAD and chest pain/angina who presented to [facility] on 11/1/19 for elective LHC with subsequent successful PCI of RCA. Patient tolerated procedure well and was admitted overnight for observation.    Procedure(s) (LRB):  CATHETERIZATION, HEART, LEFT (Left)     Indwelling Lines/Drains at time of discharge:  Lines/Drains/Airways     None                 Hospital Course:  Patient seen and examined today by myself and Dr. Jefferson and deemed suitable for discharge. No actue events overnight. Left radial access site C/D/I; no bleeding, erythema, or drainage. Patient was counseled about post-cath restrictions. She will be discharged home on ASA, Plavix, statin. No BB given intermittent bradycardia. She will need to follow-up in clinic in 1 week. Counseled on smoking cessation and referred to smoking cessation program.      Significant Diagnostic Studies: [facility]  Pending Diagnostic Studies:     None          Final Active Diagnoses:    Diagnosis Date Noted POA    PRINCIPAL PROBLEM:  Angina pectoris [I20.9] 12/04/2018 Yes    Coronary artery disease of native artery of native heart with stable angina pectoris [I25.118] 11/02/2019 Unknown    S/P coronary artery stent placement [Z95.5] 11/02/2019 Not Applicable    Family history of arteriosclerotic cardiovascular disease [Z82.49] 12/04/2018 Not Applicable    Pure hypercholesterolemia [E78.00] 12/04/2018 Yes    Smoker [F17.200] 12/04/2018 Yes      Problems Resolved During this Admission:       Discharged Condition: good    Follow Up:  Follow-up Information      Sara So PA-C In 1 week.    Specialty:  Cardiology  Contact information:  [address] [place] [address] DR Melinda NORIEGA [address] [phone]                 Patient Instructions:      Diet Cardiac     Other restrictions (specify):   Order Comments: No heavy bending or lifting with left hand  Do not submerge in water     Notify your health care provider if you experience any of the following:  temperature >100.4     Notify your health care provider if you experience any of the following:  persistent nausea and vomiting or diarrhea     Notify your health care provider if you experience any of the following:  severe uncontrolled pain     Notify your health care provider if you experience any of the following:  redness, tenderness, or signs of infection (pain, swelling, redness, odor or green/yellow discharge around incision site)     Notify your health care provider if you experience any of the following:  difficulty breathing or increased cough     Notify your health care provider if you experience any of the following:  severe persistent headache     Notify your health care provider if you experience any of the following:  worsening rash     Notify your health care provider if you experience any of the following:  persistent dizziness, light-headedness, or visual disturbances     Notify your health care provider if you experience any of the following:  increased confusion or weakness     Notify your health care provider if you experience any of the following:     Remove dressing in 24 hours     Activity as tolerated     Medications:  Reconciled Home Medications:      Medication List      START taking these medications    aspirin 81 MG EC tablet  Commonly known as:  ECOTRIN  Take 1 tablet (81 mg total) by mouth once daily.  Start taking on:  November 3, 2019     clopidogrel 75 mg tablet  Commonly known as:  PLAVIX  Take 1 tablet (75 mg total) by mouth once daily.  Start taking on:  November 3, 2019     nicotine 14  mg/24 hr  Commonly known as:  NICODERM CQ  Place 1 patch onto the skin once daily.  Replaces:  nicotine 21 mg/24 hr        CONTINUE taking these medications    ALPRAZolam 0.5 MG tablet  Commonly known as:  XANAX  Take 1 tablet (0.5 mg total) by mouth 2 (two) times daily as needed for Anxiety.     atorvastatin 20 MG tablet  Commonly known as:  LIPITOR  Take 1 tablet (20 mg total) by mouth once daily.     cholecalciferol (vitamin D3) 50,000 unit capsule  Take 1 capsule by mouth every 7 days.     esomeprazole 40 MG capsule  Commonly known as:  NEXIUM  Take 1 capsule by mouth once daily.     isosorbide mononitrate 30 MG 24 hr tablet  Commonly known as:  IMDUR  Take 1 tablet (30 mg total) by mouth once daily.     mupirocin 2 % ointment  Commonly known as:  BACTROBAN  APPLY TOPICALLY TO THE AFFECTED AREA TWICE DAILY     valACYclovir 1000 MG tablet  Commonly known as:  VALTREX  Take 2 tablets (2,000 mg total) by mouth every 12 (twelve) hours. for 2 doses     venlafaxine 37.5 MG 24 hr capsule  Commonly known as:  EFFEXOR-XR  Take 1 capsule (37.5 mg total) by mouth once daily.        STOP taking these medications    aspirin 325 MG Tab  Commonly known as:  BUFFERIN     nicotine 21 mg/24 hr  Commonly known as:  NICODERM CQ  Replaced by:  nicotine 14 mg/24 hr     ondansetron 8 MG Tbdl  Commonly known as:  ZOFRAN-ODT     varenicline 0.5 mg (11)- 1 mg (42) tablet  Commonly known as:  CHANTIX OSCAR            Time spent on the discharge of patient: 25 minutes    Sara So PA-C  Cardiology  [facility]

## 2019-11-02 NOTE — PLAN OF CARE
POC reviewed with pt. Verbalized understanding. Pt remained free from falls, fall precautions in place, pt is on tele monitor see shift rounds.  Right radial puncture site wnl throughout shift.No other c/o at this time, PIV intact. Call bell and personal belongings within reach. Hourly rounding completed. Instructed pt to call for assistance as needed. Will continue to monitor.

## 2019-11-02 NOTE — PLAN OF CARE
POC reviewed, verbalized understanding. Pt remained free from falls, fall precautions in place. Pt is SR on monitor,  VSS. No other c/o at this time.  Nicotine patch is working will take at home pt is being discharge to home PIV intact.  Discharge and discharge instructions given per float nurse calvert RN Call kam and personal belongings within reach. Hourly rounding complete. Reminded to call for assistance. Will continue to monitor.

## 2019-11-04 ENCOUNTER — PATIENT MESSAGE (OUTPATIENT)
Dept: CARDIOLOGY | Facility: CLINIC | Age: 49
End: 2019-11-04

## 2019-11-04 ENCOUNTER — CLINICAL SUPPORT (OUTPATIENT)
Dept: SMOKING CESSATION | Facility: CLINIC | Age: 49
End: 2019-11-04
Payer: COMMERCIAL

## 2019-11-04 DIAGNOSIS — F17.200 NICOTINE DEPENDENCE: Primary | ICD-10-CM

## 2019-11-04 PROCEDURE — 99404 PREV MED CNSL INDIV APPRX 60: CPT | Mod: S$GLB,,, | Performed by: GENERAL PRACTICE

## 2019-11-04 PROCEDURE — 99404 PR PREVENT COUNSEL,INDIV,60 MIN: ICD-10-PCS | Mod: S$GLB,,, | Performed by: GENERAL PRACTICE

## 2019-11-04 PROCEDURE — 99999 PR PBB SHADOW E&M-EST. PATIENT-LVL I: ICD-10-PCS | Mod: PBBFAC,,,

## 2019-11-04 PROCEDURE — 99999 PR PBB SHADOW E&M-EST. PATIENT-LVL I: CPT | Mod: PBBFAC,,,

## 2019-11-04 RX ORDER — VENLAFAXINE HYDROCHLORIDE 37.5 MG/1
CAPSULE, EXTENDED RELEASE ORAL
Qty: 30 CAPSULE | Refills: 6 | Status: SHIPPED | OUTPATIENT
Start: 2019-11-04 | End: 2019-12-03

## 2019-11-04 RX ORDER — IBUPROFEN 200 MG
1 TABLET ORAL DAILY
Qty: 14 PATCH | Refills: 1 | Status: SHIPPED | OUTPATIENT
Start: 2019-11-04 | End: 2020-01-13 | Stop reason: DRUGHIGH

## 2019-11-05 ENCOUNTER — OFFICE VISIT (OUTPATIENT)
Dept: CARDIOLOGY | Facility: CLINIC | Age: 49
End: 2019-11-05
Payer: COMMERCIAL

## 2019-11-05 ENCOUNTER — CLINICAL SUPPORT (OUTPATIENT)
Dept: CARDIOLOGY | Facility: CLINIC | Age: 49
End: 2019-11-05
Payer: COMMERCIAL

## 2019-11-05 VITALS
OXYGEN SATURATION: 98 % | BODY MASS INDEX: 27.75 KG/M2 | HEIGHT: 64 IN | HEART RATE: 62 BPM | DIASTOLIC BLOOD PRESSURE: 78 MMHG | WEIGHT: 162.56 LBS | SYSTOLIC BLOOD PRESSURE: 116 MMHG

## 2019-11-05 DIAGNOSIS — F17.200 SMOKER: ICD-10-CM

## 2019-11-05 DIAGNOSIS — I25.118 CORONARY ARTERY DISEASE OF NATIVE ARTERY OF NATIVE HEART WITH STABLE ANGINA PECTORIS: ICD-10-CM

## 2019-11-05 DIAGNOSIS — I20.9 ANGINA PECTORIS: ICD-10-CM

## 2019-11-05 DIAGNOSIS — Z95.5 S/P CORONARY ARTERY STENT PLACEMENT: ICD-10-CM

## 2019-11-05 DIAGNOSIS — R06.02 SOB (SHORTNESS OF BREATH): ICD-10-CM

## 2019-11-05 DIAGNOSIS — E78.00 PURE HYPERCHOLESTEROLEMIA: Primary | ICD-10-CM

## 2019-11-05 PROCEDURE — 93000 EKG 12-LEAD: ICD-10-PCS | Mod: S$GLB,,, | Performed by: INTERNAL MEDICINE

## 2019-11-05 PROCEDURE — 99999 PR PBB SHADOW E&M-EST. PATIENT-LVL III: ICD-10-PCS | Mod: PBBFAC,,, | Performed by: INTERNAL MEDICINE

## 2019-11-05 PROCEDURE — 99999 PR PBB SHADOW E&M-EST. PATIENT-LVL III: CPT | Mod: PBBFAC,,, | Performed by: INTERNAL MEDICINE

## 2019-11-05 PROCEDURE — 99214 PR OFFICE/OUTPT VISIT, EST, LEVL IV, 30-39 MIN: ICD-10-PCS | Mod: 25,S$GLB,, | Performed by: INTERNAL MEDICINE

## 2019-11-05 PROCEDURE — 3008F PR BODY MASS INDEX (BMI) DOCUMENTED: ICD-10-PCS | Mod: CPTII,S$GLB,, | Performed by: INTERNAL MEDICINE

## 2019-11-05 PROCEDURE — 3008F BODY MASS INDEX DOCD: CPT | Mod: CPTII,S$GLB,, | Performed by: INTERNAL MEDICINE

## 2019-11-05 PROCEDURE — 99214 OFFICE O/P EST MOD 30 MIN: CPT | Mod: 25,S$GLB,, | Performed by: INTERNAL MEDICINE

## 2019-11-05 PROCEDURE — 93000 ELECTROCARDIOGRAM COMPLETE: CPT | Mod: S$GLB,,, | Performed by: INTERNAL MEDICINE

## 2019-11-05 RX ORDER — ISOSORBIDE MONONITRATE 30 MG/1
30 TABLET, EXTENDED RELEASE ORAL DAILY
Qty: 30 TABLET | Refills: 11 | Status: ON HOLD | OUTPATIENT
Start: 2019-11-05 | End: 2019-11-08 | Stop reason: HOSPADM

## 2019-11-07 ENCOUNTER — CLINICAL SUPPORT (OUTPATIENT)
Dept: CARDIOLOGY | Facility: CLINIC | Age: 49
End: 2019-11-07
Attending: INTERNAL MEDICINE
Payer: COMMERCIAL

## 2019-11-07 PROCEDURE — 93306 TTE W/DOPPLER COMPLETE: CPT | Mod: S$GLB,,, | Performed by: INTERNAL MEDICINE

## 2019-11-07 PROCEDURE — 93306 2D ECHO WITH COLOR FLOW DOPPLER: ICD-10-PCS | Mod: S$GLB,,, | Performed by: INTERNAL MEDICINE

## 2019-11-08 ENCOUNTER — HOSPITAL ENCOUNTER (OUTPATIENT)
Facility: HOSPITAL | Age: 49
Discharge: HOME | End: 2019-11-08
Attending: INTERNAL MEDICINE | Admitting: INTERNAL MEDICINE
Payer: COMMERCIAL

## 2019-11-08 ENCOUNTER — TELEPHONE (OUTPATIENT)
Dept: CARDIOLOGY | Facility: CLINIC | Age: 49
End: 2019-11-08

## 2019-11-08 VITALS
SYSTOLIC BLOOD PRESSURE: 118 MMHG | DIASTOLIC BLOOD PRESSURE: 72 MMHG | HEIGHT: 64 IN | RESPIRATION RATE: 16 BRPM | HEART RATE: 60 BPM | WEIGHT: 160 LBS | BODY MASS INDEX: 27.31 KG/M2 | OXYGEN SATURATION: 100 % | TEMPERATURE: 98 F

## 2019-11-08 DIAGNOSIS — Z79.01 LONG TERM (CURRENT) USE OF ANTICOAGULANTS: ICD-10-CM

## 2019-11-08 DIAGNOSIS — R06.02 SOB (SHORTNESS OF BREATH): ICD-10-CM

## 2019-11-08 DIAGNOSIS — Z98.61 CORONARY ANGIOPLASTY STATUS: ICD-10-CM

## 2019-11-08 DIAGNOSIS — E78.00 PURE HYPERCHOLESTEROLEMIA: ICD-10-CM

## 2019-11-08 DIAGNOSIS — I25.118 CORONARY ARTERY DISEASE OF NATIVE ARTERY OF NATIVE HEART WITH STABLE ANGINA PECTORIS: ICD-10-CM

## 2019-11-08 DIAGNOSIS — I20.9 ANGINA PECTORIS: ICD-10-CM

## 2019-11-08 DIAGNOSIS — Z95.5 S/P CORONARY ARTERY STENT PLACEMENT: Primary | ICD-10-CM

## 2019-11-08 DIAGNOSIS — I25.10 CAD (CORONARY ARTERY DISEASE): ICD-10-CM

## 2019-11-08 LAB
CORONARY STENOSIS: ABNORMAL
CORONARY STENOSIS: ABNORMAL
CORONARY STENT: YES
DIASTOLIC DYSFUNCTION: NO
RETIRED EF AND QEF - SEE NOTES: 60 (ref 55–65)

## 2019-11-08 PROCEDURE — 93454 CATH LAB PROCEDURE: ICD-10-PCS | Mod: 26,,, | Performed by: INTERNAL MEDICINE

## 2019-11-08 PROCEDURE — 25000003 PHARM REV CODE 250

## 2019-11-08 PROCEDURE — 99152 MOD SED SAME PHYS/QHP 5/>YRS: CPT | Mod: ,,, | Performed by: INTERNAL MEDICINE

## 2019-11-08 PROCEDURE — 99152 MOD SED SAME PHYS/QHP 5/>YRS: CPT

## 2019-11-08 PROCEDURE — 63600175 PHARM REV CODE 636 W HCPCS

## 2019-11-08 PROCEDURE — 93454 CORONARY ARTERY ANGIO S&I: CPT | Mod: 26,,, | Performed by: INTERNAL MEDICINE

## 2019-11-08 PROCEDURE — G0269 OCCLUSIVE DEVICE IN VEIN ART: HCPCS

## 2019-11-08 PROCEDURE — 99152 CATH LAB PROCEDURE: ICD-10-PCS | Mod: ,,, | Performed by: INTERNAL MEDICINE

## 2019-11-08 PROCEDURE — 25500020 PHARM REV CODE 255: Mod: 59

## 2019-11-08 RX ORDER — RANOLAZINE 500 MG/1
500 TABLET, EXTENDED RELEASE ORAL 2 TIMES DAILY
Qty: 60 TABLET | Refills: 11 | Status: SHIPPED | OUTPATIENT
Start: 2019-11-08 | End: 2019-11-14

## 2019-11-08 RX ORDER — NITROGLYCERIN 0.4 MG/1
0.4 TABLET SUBLINGUAL EVERY 5 MIN PRN
Status: DISCONTINUED | OUTPATIENT
Start: 2019-11-08 | End: 2019-11-08 | Stop reason: HOSPADM

## 2019-11-08 RX ORDER — SODIUM CHLORIDE 9 MG/ML
INJECTION, SOLUTION INTRAVENOUS CONTINUOUS
Status: DISCONTINUED | OUTPATIENT
Start: 2019-11-08 | End: 2019-11-08 | Stop reason: HOSPADM

## 2019-11-08 RX ORDER — ATROPINE SULFATE 0.1 MG/ML
0.5 INJECTION INTRAVENOUS
Status: DISCONTINUED | OUTPATIENT
Start: 2019-11-08 | End: 2019-11-08 | Stop reason: HOSPADM

## 2019-11-08 RX ORDER — DIPHENHYDRAMINE HCL 50 MG
50 CAPSULE ORAL ONCE
Status: DISCONTINUED | OUTPATIENT
Start: 2019-11-08 | End: 2019-11-08 | Stop reason: HOSPADM

## 2019-11-08 NOTE — H&P
Patient ID:  Eunice Talavera is a 49 y.o. female who presents for follow-up of Chest Pain (follow up)  Pt s/p PCI of RCA. Pt with atypical sx-CP. Pt is still smoking.    Hyperlipidemia   This is a chronic problem. The current episode started more than 1 year ago. The problem is controlled. Recent lipid tests were reviewed and are variable. Associated symptoms include chest pain. Pertinent negatives include no shortness of breath. Current antihyperlipidemic treatment includes statins. The current treatment provides moderate improvement of lipids. There are no compliance problems.    Chest Pain    This is a recurrent problem. The current episode started 1 to 4 weeks ago. The problem occurs intermittently. The problem has been waxing and waning. The pain is present in the lateral region. The pain is mild. The quality of the pain is described as sharp. The pain radiates to the right neck. Pertinent negatives include no dizziness, palpitations or shortness of breath. The pain is aggravated by nothing. She has tried rest for the symptoms. The treatment provided moderate relief.   Her past medical history is significant for hyperlipidemia.   Pertinent negatives for past medical history include no muscle weakness.       Review of Systems   Constitution: Negative. Negative for weight gain.   HENT: Negative.    Eyes: Negative.    Cardiovascular: Positive for chest pain. Negative for leg swelling and palpitations.   Respiratory: Negative.  Negative for shortness of breath.    Endocrine: Negative.    Hematologic/Lymphatic: Negative.    Skin: Negative.    Musculoskeletal: Negative for muscle weakness.   Gastrointestinal: Negative.    Genitourinary: Negative.    Neurological: Negative.  Negative for dizziness.   Psychiatric/Behavioral: Negative.    Allergic/Immunologic: Negative.      Family History   Problem Relation Age of Onset    Heart disease Father     Breast cancer Maternal Grandmother     Diabetes Paternal  Grandmother     Heart disease Paternal Grandfather     Breast cancer Maternal Aunt     Breast cancer Maternal Cousin     Colon cancer Neg Hx      Past Medical and Surgical History:   Diagnosis Date    Anxiety     Depression     GERD (gastroesophageal reflux disease)     Hyperlipidemia      Social History     Socioeconomic History    Marital status:      Spouse name: Not on file    Number of children: 2    Years of education: Not on file    Highest education level: Not on file   Occupational History    Occupation: Manager   Social Needs    Financial resource strain: Not on file    Food insecurity:     Worry: Not on file     Inability: Not on file    Transportation needs:     Medical: Not on file     Non-medical: Not on file   Tobacco Use    Smoking status: Current Every Day Smoker     Packs/day: 1.00     Years: 20.00     Pack years: 20.00     Types: Cigarettes    Smokeless tobacco: Never Used   Substance and Sexual Activity    Alcohol use: Yes     Comment: occasional    Drug use: No    Sexual activity: Yes     Partners: Male   Lifestyle    Physical activity:     Days per week: Not on file     Minutes per session: Not on file    Stress: Not on file   Relationships    Social connections:     Talks on phone: Not on file     Gets together: Not on file     Attends Confucianism service: Not on file     Active member of club or organization: Not on file     Attends meetings of clubs or organizations: Not on file     Relationship status: Not on file   Other Topics Concern    Not on file   Social History Narrative    Not on file     Current Outpatient Medications on File Prior to Visit   Medication Sig Dispense Refill    ALPRAZolam (XANAX) 0.5 MG tablet Take 1 tablet (0.5 mg total) by mouth 2 (two) times daily as needed for Anxiety. 30 tablet 2    aspirin (ECOTRIN) 81 MG EC tablet Take 1 tablet (81 mg total) by mouth once daily.  0    atorvastatin (LIPITOR) 20 MG tablet Take 1 tablet (20 mg  total) by mouth once daily. 30 tablet 11    clopidogrel (PLAVIX) 75 mg tablet Take 1 tablet (75 mg total) by mouth once daily. 30 tablet 11    esomeprazole (NEXIUM) 40 MG capsule Take 1 capsule by mouth once daily.  2    isosorbide mononitrate (IMDUR) 30 MG 24 hr tablet Take 1 tablet (30 mg total) by mouth once daily. 30 tablet 11    mupirocin (BACTROBAN) 2 % ointment APPLY TOPICALLY TO THE AFFECTED AREA TWICE DAILY 22 g 0    nicotine (NICODERM CQ) 21 mg/24 hr Place 1 patch onto the skin once daily. 14 patch 1    cholecalciferol, vitamin D3, 50,000 unit capsule Take 1 capsule by mouth every 7 days.  1    valACYclovir (VALTREX) 1000 MG tablet Take 2 tablets (2,000 mg total) by mouth every 12 (twelve) hours. for 2 doses 12 tablet 1    venlafaxine (EFFEXOR-XR) 37.5 MG 24 hr capsule TAKE 1 CAPSULE BY MOUTH ONCE DAILY (Patient not taking: Reported on 11/4/2019) 30 capsule 6     No current facility-administered medications on file prior to visit.      Review of patient's allergies indicates:   Allergen Reactions    Lortab [hydrocodone-acetaminophen] Itching    Morphine Itching       Objective:     Physical Exam   Constitutional: She is oriented to person, place, and time. She appears well-developed and well-nourished.   HENT:   Head: Normocephalic and atraumatic.   Eyes: Pupils are equal, round, and reactive to light. Conjunctivae and EOM are normal.   Neck: Normal range of motion. Neck supple.   Cardiovascular: Normal rate, regular rhythm, normal heart sounds and intact distal pulses.   Pulmonary/Chest: Effort normal and breath sounds normal.   Abdominal: Soft. Bowel sounds are normal.   Musculoskeletal: Normal range of motion.   Neurological: She is alert and oriented to person, place, and time.   Skin: Skin is warm and dry.   Psychiatric: She has a normal mood and affect.   Nursing note and vitals reviewed.      Assessment:     1. Pure hypercholesterolemia    2. Angina pectoris    3. Coronary artery disease  of native artery of native heart with stable angina pectoris    4. S/P coronary artery stent placement    5. Smoker    6. SOB (shortness of breath)        Plan:     Pure hypercholesterolemia    Angina pectoris    Coronary artery disease of native artery of native heart with stable angina pectoris    S/P coronary artery stent placement    Smoker    SOB (shortness of breath)      Start imdur  Continue asa, plavix  Continue statin-hlp  Smoking cessation    Addendum: continued CP despite medical tx                     Recommend re look cath- stent patency

## 2019-11-08 NOTE — NURSING
VSS. Painfree. Ambulating to bathroom to urinate without problems. DC instructions reviewed with pt and spouse and able to teach back. No bleeding or hematoma noted to right groin.

## 2019-11-09 ENCOUNTER — NURSE TRIAGE (OUTPATIENT)
Dept: ADMINISTRATIVE | Facility: CLINIC | Age: 49
End: 2019-11-09

## 2019-11-09 NOTE — TELEPHONE ENCOUNTER
Recheck for heart catherization yesterday but prescription was called into the wrong pharmacy. Dr. Toby Morrow wrote prescription for Ranexa 500mg [id number]- take 1 tablet (500mg total) by mouth 2 times daily with 11 refills. Prescription called into [facility] in [place], [place] at [phone]. Patient advised to call pharmacy before picking up medications to make sure it was ready. Verbalized understanding.   Called patient back to inform her that [facility] has already filled the first fill and billed to insurance so she would have to pay a cash price at [facility] or wait until Monday; patient verbalized understanding.   Reason for Disposition   Caller requesting a refill, no triage required, and triager able to refill per unit policy    Protocols used: MEDICATION QUESTION CALL-A-

## 2019-11-11 ENCOUNTER — TELEPHONE (OUTPATIENT)
Dept: CARDIOLOGY | Facility: CLINIC | Age: 49
End: 2019-11-11

## 2019-11-11 NOTE — TELEPHONE ENCOUNTER
The patient has been notified about Echo is normal and all questions answered. Verbalizes understanding.

## 2019-11-12 NOTE — DISCHARGE SUMMARY
[facility] -   Cardiology  Discharge Summary      Patient Name: Eunice Talavera  MRN: [id number]  Admission Date: 11/8/2019  Hospital Length of Stay: 0 days  Discharge Date and Time: 11/8/2019  5:30 PM  Attending Physician: No att. providers found  Discharging Provider: Toby Morrow MD  Primary Care Physician: Yeison Lei MD    HPI: Pt with angina and recent PCI of RCA    Procedure(s) (LRB):  CATHETERIZATION, HEART, LEFT (Left)     Indwelling Lines/Drains at time of discharge:  Lines/Drains/Airways     None                 Hospital Course (synopsis of major diagnoses, care, treatment, and services provided during the course of the hospital stay): Coronary angiogram showed patent stent, no changes from PCI    Consults:     Significant Diagnostic Studies: Labs: All labs within the past 24 hours have been reviewed    Pending Diagnostic Studies:     None          Final Active Diagnoses:    Diagnosis Date Noted POA    CAD (coronary artery disease) [I25.10] 11/08/2019 Yes    Coronary artery disease of native artery of native heart with stable angina pectoris [I25.118] 11/02/2019 Yes    S/P coronary artery stent placement [Z95.5] 11/02/2019 Not Applicable    Angina pectoris [I20.9] 12/04/2018 Yes    Family history of arteriosclerotic cardiovascular disease [Z82.49] 12/04/2018 Not Applicable    Pure hypercholesterolemia [E78.00] 12/04/2018 Yes    Smoker [F17.200] 12/04/2018 Yes      Problems Resolved During this Admission:       Discharged Condition: good    Follow Up:    Patient Instructions:   No discharge procedures on file.  Medications:  Reconciled Home Medications:      Medication List      Start taking these medications    ranolazine 500 MG Tb12  Commonly known as:  RANEXA  Take 1 tablet (500 mg total) by mouth 2 (two) times daily.        Continue taking these medications    ALPRAZolam 0.5 MG tablet  Commonly known as:  XANAX  Take 1 tablet (0.5 mg total) by mouth 2 (two) times  daily as needed for Anxiety.     aspirin 81 MG EC tablet  Commonly known as:  ECOTRIN  Take 1 tablet (81 mg total) by mouth once daily.     atorvastatin 20 MG tablet  Commonly known as:  LIPITOR  Take 1 tablet (20 mg total) by mouth once daily.     cholecalciferol (vitamin D3) 50,000 unit capsule  Take 1 capsule by mouth every 7 days.     clopidogrel 75 mg tablet  Commonly known as:  PLAVIX  Take 1 tablet (75 mg total) by mouth once daily.     esomeprazole 40 MG capsule  Commonly known as:  NEXIUM  Take 1 capsule by mouth once daily.     nicotine 21 mg/24 hr  Commonly known as:  NICODERM CQ  Place 1 patch onto the skin once daily.     valACYclovir 1000 MG tablet  Commonly known as:  VALTREX  Take 2 tablets (2,000 mg total) by mouth every 12 (twelve) hours. for 2 doses     venlafaxine 37.5 MG 24 hr capsule  Commonly known as:  EFFEXOR-XR  TAKE 1 CAPSULE BY MOUTH ONCE DAILY        Stop taking these medications    isosorbide mononitrate 30 MG 24 hr tablet  Commonly known as:  IMDUR        Ask your doctor about these medications    mupirocin 2 % ointment  Commonly known as:  BACTROBAN  APPLY TOPICALLY TO THE AFFECTED AREA TWICE DAILY            Time spent on the discharge of patient: 30 minutes    Toby Morrow MD  Cardiology  [facility] -

## 2019-11-13 ENCOUNTER — TELEPHONE (OUTPATIENT)
Dept: SMOKING CESSATION | Facility: CLINIC | Age: 49
End: 2019-11-13

## 2019-11-14 ENCOUNTER — CLINICAL SUPPORT (OUTPATIENT)
Dept: SMOKING CESSATION | Facility: CLINIC | Age: 49
End: 2019-11-14
Payer: COMMERCIAL

## 2019-11-14 ENCOUNTER — OFFICE VISIT (OUTPATIENT)
Dept: CARDIOLOGY | Facility: CLINIC | Age: 49
End: 2019-11-14
Payer: COMMERCIAL

## 2019-11-14 VITALS
SYSTOLIC BLOOD PRESSURE: 112 MMHG | BODY MASS INDEX: 28.19 KG/M2 | HEIGHT: 64 IN | OXYGEN SATURATION: 99 % | WEIGHT: 165.13 LBS | DIASTOLIC BLOOD PRESSURE: 62 MMHG | HEART RATE: 66 BPM

## 2019-11-14 DIAGNOSIS — F17.200 NICOTINE DEPENDENCE: Primary | ICD-10-CM

## 2019-11-14 DIAGNOSIS — I25.118 CORONARY ARTERY DISEASE OF NATIVE ARTERY OF NATIVE HEART WITH STABLE ANGINA PECTORIS: ICD-10-CM

## 2019-11-14 DIAGNOSIS — E78.00 PURE HYPERCHOLESTEROLEMIA: Primary | ICD-10-CM

## 2019-11-14 DIAGNOSIS — Z95.5 S/P CORONARY ARTERY STENT PLACEMENT: ICD-10-CM

## 2019-11-14 DIAGNOSIS — I20.9 ANGINA PECTORIS: ICD-10-CM

## 2019-11-14 DIAGNOSIS — F17.200 SMOKER: ICD-10-CM

## 2019-11-14 DIAGNOSIS — I25.10 CORONARY ARTERY DISEASE INVOLVING NATIVE CORONARY ARTERY OF NATIVE HEART WITHOUT ANGINA PECTORIS: ICD-10-CM

## 2019-11-14 DIAGNOSIS — Z82.49 FAMILY HISTORY OF ARTERIOSCLEROTIC CARDIOVASCULAR DISEASE: ICD-10-CM

## 2019-11-14 PROCEDURE — 99999 PR PBB SHADOW E&M-EST. PATIENT-LVL III: ICD-10-PCS | Mod: PBBFAC,,, | Performed by: INTERNAL MEDICINE

## 2019-11-14 PROCEDURE — 99999 PR PBB SHADOW E&M-EST. PATIENT-LVL III: CPT | Mod: PBBFAC,,, | Performed by: INTERNAL MEDICINE

## 2019-11-14 PROCEDURE — 99214 PR OFFICE/OUTPT VISIT, EST, LEVL IV, 30-39 MIN: ICD-10-PCS | Mod: S$GLB,,, | Performed by: INTERNAL MEDICINE

## 2019-11-14 PROCEDURE — 99407 PR TOBACCO USE CESSATION INTENSIVE >10 MINUTES: ICD-10-PCS | Mod: S$GLB,,, | Performed by: GENERAL PRACTICE

## 2019-11-14 PROCEDURE — 3008F PR BODY MASS INDEX (BMI) DOCUMENTED: ICD-10-PCS | Mod: CPTII,S$GLB,, | Performed by: INTERNAL MEDICINE

## 2019-11-14 PROCEDURE — 3008F BODY MASS INDEX DOCD: CPT | Mod: CPTII,S$GLB,, | Performed by: INTERNAL MEDICINE

## 2019-11-14 PROCEDURE — 99214 OFFICE O/P EST MOD 30 MIN: CPT | Mod: S$GLB,,, | Performed by: INTERNAL MEDICINE

## 2019-11-14 PROCEDURE — 99407 BEHAV CHNG SMOKING > 10 MIN: CPT | Mod: S$GLB,,, | Performed by: GENERAL PRACTICE

## 2019-11-14 RX ORDER — RANOLAZINE 1000 MG/1
1000 TABLET, EXTENDED RELEASE ORAL 2 TIMES DAILY
Qty: 60 TABLET | Refills: 11 | Status: SHIPPED | OUTPATIENT
Start: 2019-11-14 | End: 2020-03-16

## 2019-11-14 RX ORDER — DM/P-EPHED/ACETAMINOPH/DOXYLAM 30-7.5/3
2 LIQUID (ML) ORAL
Qty: 200 LOZENGE | Refills: 2 | Status: SHIPPED | OUTPATIENT
Start: 2019-11-14 | End: 2019-12-03

## 2019-11-14 NOTE — PROGRESS NOTES
Subjective:   Patient ID:  Eunice Talavera is a 49 y.o. female who presents for follow-up of Pure hypercholesterolemia (1 week f/u)  Pt is s/p cath- patent stent . Note patient with small LAD chronically occluded with left to left anf right to left collaterals.  Ranexa has helped.Pt with vague dizziness.    Coronary Artery Disease   Presents for follow-up visit. Pertinent negatives include no chest pain, chest pressure, chest tightness, dizziness, leg swelling, muscle weakness, palpitations, shortness of breath or weight gain. Risk factors include hyperlipidemia. The symptoms have been stable. Compliance with diet is variable. Compliance with exercise is variable. Compliance with medications is good.   Hyperlipidemia   This is a chronic problem. The current episode started more than 1 year ago. The problem is controlled. Recent lipid tests were reviewed and are variable. Pertinent negatives include no chest pain or shortness of breath. Current antihyperlipidemic treatment includes statins. The current treatment provides moderate improvement of lipids. There are no compliance problems.        Review of Systems   Constitution: Negative. Negative for weight gain.   HENT: Negative.    Eyes: Negative.    Cardiovascular: Negative.  Negative for chest pain, leg swelling and palpitations.   Respiratory: Negative.  Negative for chest tightness and shortness of breath.    Endocrine: Negative.    Hematologic/Lymphatic: Negative.    Skin: Negative.    Musculoskeletal: Negative for muscle weakness.   Gastrointestinal: Negative.    Genitourinary: Negative.    Neurological: Negative.  Negative for dizziness.   Psychiatric/Behavioral: Negative.    Allergic/Immunologic: Negative.      Family History   Problem Relation Age of Onset    Heart disease Father     Breast cancer Maternal Grandmother     Diabetes Paternal Grandmother     Heart disease Paternal Grandfather     Breast cancer Maternal Aunt     Breast cancer Maternal  Cousin     Colon cancer Neg Hx      Past Medical History:   Diagnosis Date    Anxiety     Depression     GERD (gastroesophageal reflux disease)     Hyperlipidemia      Social History     Socioeconomic History    Marital status:      Spouse name: Not on file    Number of children: 2    Years of education: Not on file    Highest education level: Not on file   Occupational History    Occupation: Manager   Social Needs    Financial resource strain: Not on file    Food insecurity:     Worry: Not on file     Inability: Not on file    Transportation needs:     Medical: Not on file     Non-medical: Not on file   Tobacco Use    Smoking status: Current Every Day Smoker     Packs/day: 1.00     Years: 20.00     Pack years: 20.00     Types: Cigarettes    Smokeless tobacco: Never Used   Substance and Sexual Activity    Alcohol use: Yes     Comment: occasional    Drug use: No    Sexual activity: Yes     Partners: Male   Lifestyle    Physical activity:     Days per week: Not on file     Minutes per session: Not on file    Stress: Not on file   Relationships    Social connections:     Talks on phone: Not on file     Gets together: Not on file     Attends Amish service: Not on file     Active member of club or organization: Not on file     Attends meetings of clubs or organizations: Not on file     Relationship status: Not on file   Other Topics Concern    Not on file   Social History Narrative    Not on file     Current Outpatient Medications on File Prior to Visit   Medication Sig Dispense Refill    ALPRAZolam (XANAX) 0.5 MG tablet Take 1 tablet (0.5 mg total) by mouth 2 (two) times daily as needed for Anxiety. 30 tablet 2    aspirin (ECOTRIN) 81 MG EC tablet Take 1 tablet (81 mg total) by mouth once daily.  0    atorvastatin (LIPITOR) 20 MG tablet Take 1 tablet (20 mg total) by mouth once daily. 30 tablet 11    clopidogrel (PLAVIX) 75 mg tablet Take 1 tablet (75 mg total) by mouth once daily.  30 tablet 11    esomeprazole (NEXIUM) 40 MG capsule Take 1 capsule by mouth once daily.  2    nicotine (NICODERM CQ) 21 mg/24 hr Place 1 patch onto the skin once daily. 14 patch 1    ranolazine (RANEXA) 500 MG Tb12 Take 1 tablet (500 mg total) by mouth 2 (two) times daily. 60 tablet 11    cholecalciferol, vitamin D3, 50,000 unit capsule Take 1 capsule by mouth every 7 days.  1    mupirocin (BACTROBAN) 2 % ointment APPLY TOPICALLY TO THE AFFECTED AREA TWICE DAILY (Patient not taking: Reported on 11/14/2019) 22 g 0    valACYclovir (VALTREX) 1000 MG tablet Take 2 tablets (2,000 mg total) by mouth every 12 (twelve) hours. for 2 doses 12 tablet 1    venlafaxine (EFFEXOR-XR) 37.5 MG 24 hr capsule TAKE 1 CAPSULE BY MOUTH ONCE DAILY (Patient not taking: Reported on 11/14/2019) 30 capsule 6     No current facility-administered medications on file prior to visit.      Review of patient's allergies indicates:   Allergen Reactions    Lortab [hydrocodone-acetaminophen] Itching    Morphine Itching       Objective:     Physical Exam   Constitutional: She is oriented to person, place, and time. She appears well-developed and well-nourished.   HENT:   Head: Normocephalic and atraumatic.   Eyes: Pupils are equal, round, and reactive to light. Conjunctivae and EOM are normal.   Neck: Normal range of motion. Neck supple.   Cardiovascular: Normal rate, regular rhythm, normal heart sounds and intact distal pulses.   Pulmonary/Chest: Effort normal and breath sounds normal.   Abdominal: Soft. Bowel sounds are normal.   Musculoskeletal: Normal range of motion.   Neurological: She is alert and oriented to person, place, and time.   Skin: Skin is warm and dry.   Psychiatric: She has a normal mood and affect.   Nursing note and vitals reviewed.      Assessment:     1. Pure hypercholesterolemia    2. Angina pectoris    3. Coronary artery disease of native artery of native heart with stable angina pectoris    4. S/P coronary artery  stent placement    5. Coronary artery disease involving native coronary artery of native heart without angina pectoris    6. Smoker    7. Family history of arteriosclerotic cardiovascular disease        Plan:     Pure hypercholesterolemia    Angina pectoris    Coronary artery disease of native artery of native heart with stable angina pectoris    S/P coronary artery stent placement    Coronary artery disease involving native coronary artery of native heart without angina pectoris    Smoker    Family history of arteriosclerotic cardiovascular disease      Increase ranexa  Smoking cessation  Continue statin-hlp  Continue asa/plavix- cad

## 2019-11-15 ENCOUNTER — CLINICAL SUPPORT (OUTPATIENT)
Dept: SMOKING CESSATION | Facility: CLINIC | Age: 49
End: 2019-11-15
Payer: COMMERCIAL

## 2019-11-15 DIAGNOSIS — F17.200 NICOTINE DEPENDENCE: Primary | ICD-10-CM

## 2019-11-15 PROCEDURE — 99407 PR TOBACCO USE CESSATION INTENSIVE >10 MINUTES: ICD-10-PCS | Mod: S$GLB,,, | Performed by: GENERAL PRACTICE

## 2019-11-15 PROCEDURE — 99407 BEHAV CHNG SMOKING > 10 MIN: CPT | Mod: S$GLB,,, | Performed by: GENERAL PRACTICE

## 2019-11-15 RX ORDER — VARENICLINE TARTRATE 0.5 (11)-1
KIT ORAL
Qty: 1 PACKAGE | Refills: 0 | Status: SHIPPED | OUTPATIENT
Start: 2019-11-15 | End: 2019-11-20 | Stop reason: RX

## 2019-11-20 ENCOUNTER — CLINICAL SUPPORT (OUTPATIENT)
Dept: SMOKING CESSATION | Facility: CLINIC | Age: 49
End: 2019-11-20
Payer: COMMERCIAL

## 2019-11-20 DIAGNOSIS — F17.200 NICOTINE DEPENDENCE: Primary | ICD-10-CM

## 2019-11-20 PROCEDURE — 99407 PR TOBACCO USE CESSATION INTENSIVE >10 MINUTES: ICD-10-PCS | Mod: S$GLB,,, | Performed by: GENERAL PRACTICE

## 2019-11-20 PROCEDURE — 99407 BEHAV CHNG SMOKING > 10 MIN: CPT | Mod: S$GLB,,, | Performed by: GENERAL PRACTICE

## 2019-11-20 RX ORDER — VARENICLINE TARTRATE 0.5 (11)-1
KIT ORAL
Qty: 1 PACKAGE | Refills: 0 | Status: SHIPPED | OUTPATIENT
Start: 2019-11-20 | End: 2019-12-03

## 2019-11-26 ENCOUNTER — CLINICAL SUPPORT (OUTPATIENT)
Dept: SMOKING CESSATION | Facility: CLINIC | Age: 49
End: 2019-11-26
Payer: COMMERCIAL

## 2019-11-26 DIAGNOSIS — F17.200 NICOTINE DEPENDENCE: Primary | ICD-10-CM

## 2019-11-26 PROCEDURE — 99401 PREV MED CNSL INDIV APPRX 15: CPT | Mod: S$GLB,,, | Performed by: GENERAL PRACTICE

## 2019-11-26 PROCEDURE — 99401 PR PREVENT COUNSEL,INDIV,15 MIN: ICD-10-PCS | Mod: S$GLB,,, | Performed by: GENERAL PRACTICE

## 2019-11-26 NOTE — PROGRESS NOTES
Individual Follow-Up Form    11/26/2019    Quit Date: 11-    Clinical Status of Patient: Outpatient    Length of Service: 15 minutes    Continuing Medication: yes  Chantix     Target Symptoms: Withdrawal and medication side effects. The following were  rated moderate (3) to severe (4) on TCRS:  · Moderate (3): anxious, desire tobacco  · Severe (4): none    Comments: Spoke with patient at length in regards ot her smoking cessation progress. She stated that she left a verbal message yesterday stating that she would not be able to make her scheduled appointment. She has reached her target quit goal on 11-. The patient remains on the prescribed tobacco cessation medication regimen of 1 mg Chantix BID without any negative side effects at this time. She states that she still has cravings to smoke but is able to use the coping strategies as previously discussed to refrain from smoking. Congratulated her on her success thus far. Discussed coping strategies for high risk situations that she may encounter during the holiday season. She verbalized understanding. The patient denies any abnormal behavioral or mental changes at this time. Will continue to encourage and monitor progress.    Diagnosis: F17.200    Next Visit: 1 week

## 2019-12-03 ENCOUNTER — TELEPHONE (OUTPATIENT)
Dept: CARDIOLOGY | Facility: HOSPITAL | Age: 49
End: 2019-12-03

## 2019-12-03 ENCOUNTER — HOSPITAL ENCOUNTER (EMERGENCY)
Facility: HOSPITAL | Age: 49
Discharge: HOME | End: 2019-12-03
Attending: EMERGENCY MEDICINE
Payer: COMMERCIAL

## 2019-12-03 VITALS
RESPIRATION RATE: 18 BRPM | TEMPERATURE: 98 F | SYSTOLIC BLOOD PRESSURE: 133 MMHG | DIASTOLIC BLOOD PRESSURE: 77 MMHG | HEART RATE: 71 BPM | OXYGEN SATURATION: 100 % | HEIGHT: 64 IN | WEIGHT: 165 LBS | BODY MASS INDEX: 28.17 KG/M2

## 2019-12-03 DIAGNOSIS — R07.9 CHEST PAIN, UNSPECIFIED TYPE: Primary | ICD-10-CM

## 2019-12-03 DIAGNOSIS — R07.9 CHEST PAIN: ICD-10-CM

## 2019-12-03 DIAGNOSIS — I25.111 CORONARY ARTERY DISEASE INVOLVING NATIVE CORONARY ARTERY OF NATIVE HEART WITH ANGINA PECTORIS WITH DOCUMENTED SPASM: Primary | ICD-10-CM

## 2019-12-03 LAB
ALBUMIN SERPL BCP-MCNC: 3.3 G/DL (ref 3.5–5.2)
ALP SERPL-CCNC: 65 U/L (ref 55–135)
ALT SERPL W/O P-5'-P-CCNC: 12 U/L (ref 10–44)
ANION GAP SERPL CALC-SCNC: 10 MMOL/L (ref 8–16)
AST SERPL-CCNC: 16 U/L (ref 10–40)
BASOPHILS # BLD AUTO: 0.14 K/UL (ref 0–0.2)
BASOPHILS NFR BLD: 1.5 % (ref 0–1.9)
BILIRUB SERPL-MCNC: 0.3 MG/DL (ref 0.1–1)
BNP SERPL-MCNC: 33 PG/ML (ref 0–99)
BUN SERPL-MCNC: 9 MG/DL (ref 6–20)
CALCIUM SERPL-MCNC: 8.8 MG/DL (ref 8.7–10.5)
CHLORIDE SERPL-SCNC: 106 MMOL/L (ref 95–110)
CO2 SERPL-SCNC: 24 MMOL/L (ref 23–29)
CREAT SERPL-MCNC: 0.9 MG/DL (ref 0.5–1.4)
DIFFERENTIAL METHOD: ABNORMAL
EOSINOPHIL # BLD AUTO: 1.2 K/UL (ref 0–0.5)
EOSINOPHIL NFR BLD: 13.1 % (ref 0–8)
ERYTHROCYTE [DISTWIDTH] IN BLOOD BY AUTOMATED COUNT: 12.8 % (ref 11.5–14.5)
EST. GFR  (AFRICAN AMERICAN): >60 ML/MIN/1.73 M^2
EST. GFR  (NON AFRICAN AMERICAN): >60 ML/MIN/1.73 M^2
GLUCOSE SERPL-MCNC: 85 MG/DL (ref 70–110)
HCT VFR BLD AUTO: 39.4 % (ref 37–48.5)
HGB BLD-MCNC: 12.9 G/DL (ref 12–16)
HIV 1+2 AB+HIV1 P24 AG SERPL QL IA: NEGATIVE
IMM GRANULOCYTES # BLD AUTO: 0.05 K/UL (ref 0–0.04)
IMM GRANULOCYTES NFR BLD AUTO: 0.6 % (ref 0–0.5)
LYMPHOCYTES # BLD AUTO: 1.9 K/UL (ref 1–4.8)
LYMPHOCYTES NFR BLD: 20.5 % (ref 18–48)
MCH RBC QN AUTO: 29.1 PG (ref 27–31)
MCHC RBC AUTO-ENTMCNC: 32.7 G/DL (ref 32–36)
MCV RBC AUTO: 89 FL (ref 82–98)
MONOCYTES # BLD AUTO: 0.7 K/UL (ref 0.3–1)
MONOCYTES NFR BLD: 7.5 % (ref 4–15)
NEUTROPHILS # BLD AUTO: 5.2 K/UL (ref 1.8–7.7)
NEUTROPHILS NFR BLD: 56.8 % (ref 38–73)
NRBC BLD-RTO: 0 /100 WBC
PLATELET # BLD AUTO: 263 K/UL (ref 150–350)
PMV BLD AUTO: 10.5 FL (ref 9.2–12.9)
POTASSIUM SERPL-SCNC: 4.1 MMOL/L (ref 3.5–5.1)
PROT SERPL-MCNC: 6.3 G/DL (ref 6–8.4)
RBC # BLD AUTO: 4.43 M/UL (ref 4–5.4)
SODIUM SERPL-SCNC: 140 MMOL/L (ref 136–145)
TROPONIN I SERPL DL<=0.01 NG/ML-MCNC: 0.01 NG/ML (ref 0–0.03)
WBC # BLD AUTO: 9.08 K/UL (ref 3.9–12.7)

## 2019-12-03 PROCEDURE — 99285 EMERGENCY DEPT VISIT HI MDM: CPT | Mod: 25

## 2019-12-03 PROCEDURE — 93010 ELECTROCARDIOGRAM REPORT: CPT | Mod: ,,, | Performed by: INTERNAL MEDICINE

## 2019-12-03 PROCEDURE — 86703 HIV-1/HIV-2 1 RESULT ANTBDY: CPT

## 2019-12-03 PROCEDURE — 99284 PR EMERGENCY DEPT VISIT,LEVEL IV: ICD-10-PCS | Mod: ,,, | Performed by: INTERNAL MEDICINE

## 2019-12-03 PROCEDURE — 85025 COMPLETE CBC W/AUTO DIFF WBC: CPT

## 2019-12-03 PROCEDURE — 84484 ASSAY OF TROPONIN QUANT: CPT

## 2019-12-03 PROCEDURE — 83880 ASSAY OF NATRIURETIC PEPTIDE: CPT

## 2019-12-03 PROCEDURE — 93005 ELECTROCARDIOGRAM TRACING: CPT

## 2019-12-03 PROCEDURE — 99284 EMERGENCY DEPT VISIT MOD MDM: CPT | Mod: ,,, | Performed by: INTERNAL MEDICINE

## 2019-12-03 PROCEDURE — 93010 EKG 12-LEAD: ICD-10-PCS | Mod: ,,, | Performed by: INTERNAL MEDICINE

## 2019-12-03 PROCEDURE — 80053 COMPREHEN METABOLIC PANEL: CPT

## 2019-12-03 RX ORDER — AMLODIPINE BESYLATE 2.5 MG/1
2.5 TABLET ORAL DAILY
Qty: 30 TABLET | Refills: 11 | Status: SHIPPED | OUTPATIENT
Start: 2019-12-03 | End: 2020-03-16

## 2019-12-03 RX ORDER — NITROGLYCERIN 40 MG/1
1 PATCH TRANSDERMAL DAILY
Qty: 30 PATCH | Refills: 11 | Status: SHIPPED | OUTPATIENT
Start: 2019-12-03 | End: 2020-04-24

## 2019-12-03 RX ORDER — ASPIRIN 325 MG
325 TABLET ORAL
Status: DISCONTINUED | OUTPATIENT
Start: 2019-12-03 | End: 2019-12-03 | Stop reason: HOSPADM

## 2019-12-03 RX ORDER — METOPROLOL TARTRATE 25 MG/1
25 TABLET, FILM COATED ORAL 2 TIMES DAILY
Qty: 60 TABLET | Refills: 11 | Status: SHIPPED | OUTPATIENT
Start: 2019-12-03 | End: 2020-03-16

## 2019-12-03 NOTE — SUBJECTIVE & OBJECTIVE
Past Medical History:   Diagnosis Date    Anxiety     Depression     GERD (gastroesophageal reflux disease)     Hyperlipidemia        Past Surgical History:   Procedure Laterality Date    BARIATRIC SURGERY      Gastric bypass    breast augmentation      BREAST SURGERY Bilateral     10/2016    HYSTERECTOMY      LEFT HEART CATHETERIZATION Left 11/1/2019    Procedure: CATHETERIZATION, HEART, LEFT;  Surgeon: Toby Morrow MD;  Location: [facility] CATH LAB;  Service: Cardiology;  Laterality: Left;  pt informed 11am start time    LEFT HEART CATHETERIZATION Left 11/8/2019    Procedure: CATHETERIZATION, HEART, LEFT;  Surgeon: Toby Morrow MD;  Location: [facility] CATH LAB;  Service: Cardiology;  Laterality: Left;    TUBAL LIGATION         Review of patient's allergies indicates:   Allergen Reactions    Lortab [hydrocodone-acetaminophen] Itching    Morphine Itching       No current facility-administered medications on file prior to encounter.      Current Outpatient Medications on File Prior to Encounter   Medication Sig    aspirin (ECOTRIN) 81 MG EC tablet Take 1 tablet (81 mg total) by mouth once daily.    atorvastatin (LIPITOR) 20 MG tablet Take 1 tablet (20 mg total) by mouth once daily.    clopidogrel (PLAVIX) 75 mg tablet Take 1 tablet (75 mg total) by mouth once daily.    nicotine (NICODERM CQ) 21 mg/24 hr Place 1 patch onto the skin once daily.    ranolazine (RANEXA) 1,000 mg Tb12 Take 1 tablet (1,000 mg total) by mouth 2 (two) times daily.    valACYclovir (VALTREX) 1000 MG tablet Take 2 tablets (2,000 mg total) by mouth every 12 (twelve) hours. for 2 doses    [DISCONTINUED] ALPRAZolam (XANAX) 0.5 MG tablet Take 1 tablet (0.5 mg total) by mouth 2 (two) times daily as needed for Anxiety.    [DISCONTINUED] cholecalciferol, vitamin D3, 50,000 unit capsule Take 1 capsule by mouth every 7 days.    [DISCONTINUED] esomeprazole (NEXIUM) 40 MG capsule Take 1 capsule by mouth once daily.     [DISCONTINUED] mupirocin (BACTROBAN) 2 % ointment APPLY TOPICALLY TO THE AFFECTED AREA TWICE DAILY (Patient not taking: Reported on 2019)    [DISCONTINUED] nicotine polacrilex 2 MG Lozg Take 1 lozenge (2 mg total) by mouth as needed.    [DISCONTINUED] varenicline (CHANTIX STARTING MONTH BOX) 0.5 mg (11)- 1 mg (42) tablet Take one 0.5mg tab by mouth once daily X3 days,then increase to one 0.5mg tab twice daily X4 days,then increase to one 1mg tab twice daily    [DISCONTINUED] venlafaxine (EFFEXOR-XR) 37.5 MG 24 hr capsule TAKE 1 CAPSULE BY MOUTH ONCE DAILY (Patient not taking: Reported on 2019)     Family History     Problem Relation (Age of Onset)    Breast cancer Maternal Grandmother, Maternal Aunt, Maternal Cousin    Diabetes Paternal Grandmother    Heart disease Father, Paternal Grandfather        Tobacco Use    Smoking status: Former Smoker     Packs/day: 1.00     Years: 20.00     Pack years: 20.00     Types: Cigarettes     Last attempt to quit: 2019     Years since quittin.0    Smokeless tobacco: Never Used   Substance and Sexual Activity    Alcohol use: Yes     Comment: occasional    Drug use: No    Sexual activity: Yes     Partners: Male     Review of Systems   Constitution: Positive for malaise/fatigue.   HENT: Negative for hearing loss and hoarse voice.    Eyes: Negative for blurred vision and visual disturbance.   Cardiovascular: Positive for chest pain. Negative for claudication, dyspnea on exertion, irregular heartbeat, leg swelling, near-syncope, orthopnea, palpitations, paroxysmal nocturnal dyspnea and syncope.   Respiratory: Negative for cough, hemoptysis, shortness of breath, sleep disturbances due to breathing, snoring and wheezing.    Endocrine: Negative for cold intolerance and heat intolerance.   Hematologic/Lymphatic: Does not bruise/bleed easily.   Skin: Negative for color change, dry skin and nail changes.   Musculoskeletal: Negative for arthritis, back pain,  joint pain and myalgias.   Gastrointestinal: Negative for bloating, abdominal pain, constipation, nausea and vomiting.   Genitourinary: Negative for dysuria, flank pain, hematuria and hesitancy.   Neurological: Negative for headaches, light-headedness, loss of balance, numbness, paresthesias and weakness.   Psychiatric/Behavioral: Negative for altered mental status.   Allergic/Immunologic: Negative for environmental allergies.     Objective:     Vital Signs (Most Recent):  Temp: 97.5 °F (36.4 °C) (12/03/19 1300)  Pulse: 71 (12/03/19 1327)  Resp: 18 (12/03/19 1327)  BP: 133/77 (12/03/19 1300)  SpO2: 100 % (12/03/19 1300) Vital Signs (24h Range):  Temp:  [97.5 °F (36.4 °C)] 97.5 °F (36.4 °C)  Pulse:  [67-82] 71  Resp:  [18-20] 18  SpO2:  [97 %-100 %] 100 %  BP: (133-160)/(77-89) 133/77     Weight: 74.8 kg (165 lb)  Body mass index is 28.32 kg/m².    SpO2: 100 %  O2 Device (Oxygen Therapy): room air    No intake or output data in the 24 hours ending 12/03/19 1330    Lines/Drains/Airways     None                 Physical Exam   Constitutional: She is oriented to person, place, and time. She appears well-developed and well-nourished. No distress.   HENT:   Head: Normocephalic and atraumatic.   Eyes: Pupils are equal, round, and reactive to light.   Neck: Normal range of motion and full passive range of motion without pain. Neck supple. No JVD present.   Cardiovascular: Normal rate, regular rhythm, S1 normal, S2 normal and intact distal pulses. PMI is not displaced. Exam reveals no distant heart sounds.   No murmur heard.  Pulses:       Radial pulses are 2+ on the right side, and 2+ on the left side.        Dorsalis pedis pulses are 2+ on the right side, and 2+ on the left side.   +elevated BP in ED  +left chest wall heaviness/tightness constant in ED   Pulmonary/Chest: Effort normal and breath sounds normal. No accessory muscle usage. No respiratory distress. She has no decreased breath sounds. She has no wheezes. She  has no rales.   Abdominal: Soft. Bowel sounds are normal. She exhibits no distension.   Musculoskeletal: Normal range of motion. She exhibits no edema.        Right ankle: She exhibits no swelling.        Left ankle: She exhibits no swelling.   Neurological: She is alert and oriented to person, place, and time.   Skin: Skin is warm and dry. She is not diaphoretic. No cyanosis. Nails show no clubbing.   Psychiatric: She has a normal mood and affect. Her speech is normal and behavior is normal. Judgment and thought content normal. Cognition and memory are normal.   Nursing note and vitals reviewed.      Significant Labs:   BMP:   Recent Labs   Lab 12/03/19  1035   GLU 85      K 4.1      CO2 24   BUN 9   CREATININE 0.9   CALCIUM 8.8   , CBC   Recent Labs   Lab 12/03/19  1035   WBC 9.08   HGB 12.9   HCT 39.4      , Troponin   Recent Labs   Lab 12/03/19  1035   TROPONINI 0.007    and All pertinent lab results from the last 24 hours have been reviewed.    Significant Imaging: Echocardiogram:   2D echo with color flow doppler:   Results for orders placed or performed in visit on 10/24/19   2D echo with color flow doppler   Result Value Ref Range    QEF 60 55 - 65    Diastolic Dysfunction No     Narrative    Date of Procedure: 11/07/2019        TEST DESCRIPTION       Aorta: The aortic root is normal in size, measuring 2.3 cm at sinotubular junction and 2.4 cm at Sinuses of Valsalva. The proximal ascending aorta is normal in size, measuring 2.8 cm across.     Left Atrium: The left atrial volume index is normal, measuring 22.71 cc/m2.     Left Ventricle: The left ventricle is normal in size, with an end-diastolic diameter of 4.4 cm, and an end-systolic diameter of 3.0 cm. LV wall thickness is normal, with the septum measuring 1.2 cm and the posterior wall measuring 1.1 cm across. Relative   wall thickness was increased at 0.50, and the LV mass index was increased at 133.3 g/m2 consistent with concentric  left ventricular hypertrophy. There are no regional wall motion abnormalities. Left ventricular systolic function appears normal. Visually   estimated ejection fraction is 60-65%. The LV Doppler derived stroke volume equals 63.0 ccs.     Diastolic indices: E wave velocity 0.6 m/s, E/A ratio 1.1,  msec., E/e' ratio(avg) 4. Diastolic function is normal.     Right Atrium: The right atrium is normal in size, measuring 4.8 cm in length and 2.7 cm in width in the apical view.     Right Ventricle: The right ventricle is normal in size measuring 2.3 cm at the base in the apical right ventricle-focused view. Global right ventricular systolic function appears normal. Tricuspid annular plane systolic excursion (TAPSE) is 2.4 cm.     Aortic Valve:  Aortic valve is normal in structure with normal leaflet mobility.     Mitral Valve:  Mitral valve is normal in structure with normal leaflet mobility.     Tricuspid Valve:  Tricuspid valve is normal in structure with normal leaflet mobility.     Pulmonary Valve:  Pulmonary valve is normal in structure with normal leaflet mobility.     Intracavitary: There is no evidence of pericardial effusion, intracavity mass, thrombi, or vegetation.         CONCLUSIONS     1 - Concentric hypertrophy.     2 - No wall motion abnormalities.     3 - Normal left ventricular systolic function (EF 60-65%).     4 - Normal left ventricular diastolic function.     5 - Normal right ventricular systolic function .             This document has been electronically    SIGNED BY: Toby Morrow MD On: 11/08/2019 15:29    and X-Ray: CXR: X-Ray Chest 1 View (CXR): No results found for this visit on 12/03/19. and X-Ray Chest PA and Lateral (CXR):   Results for orders placed or performed during the hospital encounter of 12/03/19   X-Ray Chest PA And Lateral    Narrative    EXAMINATION:  XR CHEST PA AND LATERAL    CLINICAL HISTORY:  Chest Pain;    TECHNIQUE:  PA and lateral views of the chest were  performed.    COMPARISON:  10/24/2019    FINDINGS:  The lungs are clear, with normal appearance of pulmonary vasculature and no pleural effusion or pneumothorax.    The cardiac silhouette is normal in size. The hilar and mediastinal contours are unremarkable.    Bones are intact.      Impression    No acute abnormality.      Electronically signed by: Tj Figueroa  Date:    12/03/2019  Time:    10:57

## 2019-12-03 NOTE — HPI
"Eunice Talavera is a 49 year old female who presented to [facility] ED due to ongoing chest tightness since recent PCI per Dr. Morrow. Her current medical conditions include CAD s/p PCI of RCA,  of LAD, HTN, HLP, ex-smoker. ED workup revealed BNP 33, Troponin 0.007, K+ 4.1, Cr 0.9. /88 upon arrival to ED. EMS administered NTG SL x 1 dose en route with some improvement in left sided chest tightness. Cardiology consulted to assist with medical management. Chart reviewed, patient seen and examined. She reports prior to PCI of RCA she had exertional chest discomfort. Since PCI of RCA she reports constant "chest tightness" to left chest wall which worsens at times and then improves. She denies that she has any chest pain free days since PCI of RCA. She was seen by Dr. Morrow and Ranexa increased to 1000 mg BID without any improvements in her symptoms. She has quit smoking about 2 weeks ago, congratulations provided. Discussed with patient the importance of MPI stress test to evaluate for ischemia and will decide upon further management options afterwards. Will optimize her medical therapy for CAD as well by adding Norvasc 2.5 mg daily, NTG patch daily, and Lopressor 25 mg BID. Will have her seen in clinic next week for further evaluation.   "

## 2019-12-03 NOTE — ED PROVIDER NOTES
"SCRIBE #1 NOTE: I, Iraida Clark, am scribing for, and in the presence of, Lenin Barnhart Do, MD. I have scribed the entire note.       History     Chief Complaint   Patient presents with    Chest Pain     c/o chest pain, and nausea, patient states stent placement on Nov 1st      Review of patient's allergies indicates:   Allergen Reactions    Lortab [hydrocodone-acetaminophen] Itching    Morphine Itching         History of Present Illness     HPI    12/3/2019, 10:50 AM  History obtained from the patient      History of Present Illness: Eunice Talavera is a 49 y.o. female patient with a PMHx of HLD, GERD, depression, anxiety, and s/p left heart cath (11/1/19 and 11/8/19) who presents to the Emergency Department for evaluation of heavy CP which onset suddenly this morning. Pt reports that she has "muscle spasms" in her chest frequently, but this morning the sx were worse and accompained with a chest heaviness. Symptoms are intermittent and moderate in severity. No mitigating or exacerbating factors reported. Associated sxs include nausea, fatigue, chest tightness, and lightheadedness. Patient denies any fever/ chills, SOB, cough, BLE edema, palpations, numbness, HA, dizziness, rash, wound, abdominal pain, emesis, diarrhea, back/ neck pain, sore throat, congestion, dysuria, hematuria, localized weakness, easily bruising, and all other sxs at this time. Prior Tx includes 3 Nitro and 4 81 mg Aspirin en route via EMS. No further complaints or concerns at this time.     Arrival mode: EMS    PCP: Yeison Lei MD   Cardiologist: Dr. Morrow     Past Medical History:  Past Medical History:   Diagnosis Date    Anxiety     Depression     GERD (gastroesophageal reflux disease)     Hyperlipidemia        Past Surgical History:  Past Surgical History:   Procedure Laterality Date    BARIATRIC SURGERY      Gastric bypass    breast augmentation      BREAST SURGERY Bilateral     10/2016    HYSTERECTOMY      " LEFT HEART CATHETERIZATION Left 2019    Procedure: CATHETERIZATION, HEART, LEFT;  Surgeon: Toby Morrow MD;  Location: [facility] CATH LAB;  Service: Cardiology;  Laterality: Left;  pt informed 11am start time    LEFT HEART CATHETERIZATION Left 2019    Procedure: CATHETERIZATION, HEART, LEFT;  Surgeon: Toby Morrow MD;  Location: [facility] CATH LAB;  Service: Cardiology;  Laterality: Left;    TUBAL LIGATION           Family History:  Family History   Problem Relation Age of Onset    Heart disease Father     Breast cancer Maternal Grandmother     Diabetes Paternal Grandmother     Heart disease Paternal Grandfather     Breast cancer Maternal Aunt     Breast cancer Maternal Cousin     Colon cancer Neg Hx        Social History:  Social History     Tobacco Use    Smoking status: Former Smoker     Packs/day: 1.00     Years: 20.00     Pack years: 20.00     Types: Cigarettes     Last attempt to quit: 2019     Years since quittin.0    Smokeless tobacco: Never Used   Substance and Sexual Activity    Alcohol use: Yes     Comment: occasional    Drug use: No    Sexual activity: Yes     Partners: Male        Review of Systems     Review of Systems   Constitutional: Positive for fatigue. Negative for chills and fever.   HENT: Negative for congestion and sore throat.    Respiratory: Positive for chest tightness. Negative for cough and shortness of breath.    Cardiovascular: Positive for chest pain. Negative for palpitations and leg swelling (BLE).   Gastrointestinal: Positive for nausea. Negative for abdominal pain, diarrhea and vomiting.   Genitourinary: Negative for dysuria and hematuria.   Musculoskeletal: Negative for back pain and neck pain.   Skin: Negative for rash and wound.   Neurological: Positive for light-headedness. Negative for dizziness, weakness, numbness and headaches.   Hematological: Does not bruise/bleed easily.   All other systems reviewed and are negative.     Physical Exam  "    Initial Vitals [12/03/19 1016]   BP Pulse Resp Temp SpO2   (!) 151/88 82 20 97.5 °F (36.4 °C) 97 %      MAP       --          Physical Exam  Nursing Notes and Vital Signs Reviewed.   Constitutional: Patient is in no acute distress. Well-developed and well-nourished.  Head: Atraumatic. Normocephalic.  Eyes: PERRL. EOM intact. Conjunctivae are not pale. No scleral icterus.  ENT: Mucous membranes are moist. Oropharynx is clear and symmetric.    Neck: Supple. Full ROM. No lymphadenopathy.  Cardiovascular: Regular rate. Regular rhythm. No murmurs, rubs, or gallops. Distal pulses are 2+ and symmetric.  Pulmonary/Chest: No respiratory distress. Clear to auscultation bilaterally. No wheezing or rales.  Abdominal: Soft and non-distended.  There is no tenderness.  No rebound, guarding, or rigidity. Good bowel sounds.  Genitourinary: No CVA tenderness  Musculoskeletal: Moves all extremities. No obvious deformities. No edema. No calf tenderness.  Skin: Warm and dry.  Neurological:  Alert, awake, and appropriate.  Normal speech.  No acute focal neurological deficits are appreciated.  Psychiatric: Normal affect. Good eye contact. Appropriate in content.     ED Course   Procedures  ED Vital Signs:  Vitals:    12/03/19 1016 12/03/19 1045   BP: (!) 151/88 (!) 152/78   Pulse: 82 67   Resp: 20 20   Temp: 97.5 °F (36.4 °C)    TempSrc: Oral    SpO2: 97% 100%   Height: 5' 4" (1.626 m)        Abnormal Lab Results:  Labs Reviewed   CBC W/ AUTO DIFFERENTIAL - Abnormal; Notable for the following components:       Result Value    Immature Granulocytes 0.6 (*)     Immature Grans (Abs) 0.05 (*)     Eos # 1.2 (*)     Eosinophil% 13.1 (*)     All other components within normal limits   COMPREHENSIVE METABOLIC PANEL - Abnormal; Notable for the following components:    Albumin 3.3 (*)     All other components within normal limits   HIV 1 / 2 ANTIBODY   TROPONIN I   B-TYPE NATRIURETIC PEPTIDE        All Lab Results:  Results for orders placed or " performed during the hospital encounter of 12/03/19   HIV 1/2 Ag/Ab (4th Gen)   Result Value Ref Range    HIV 1/2 Ag/Ab Negative Negative   CBC auto differential   Result Value Ref Range    WBC 9.08 3.90 - 12.70 K/uL    RBC 4.43 4.00 - 5.40 M/uL    Hemoglobin 12.9 12.0 - 16.0 g/dL    Hematocrit 39.4 37.0 - 48.5 %    Mean Corpuscular Volume 89 82 - 98 fL    Mean Corpuscular Hemoglobin 29.1 27.0 - 31.0 pg    Mean Corpuscular Hemoglobin Conc 32.7 32.0 - 36.0 g/dL    RDW 12.8 11.5 - 14.5 %    Platelets 263 150 - 350 K/uL    MPV 10.5 9.2 - 12.9 fL    Immature Granulocytes 0.6 (H) 0.0 - 0.5 %    Gran # (ANC) 5.2 1.8 - 7.7 K/uL    Immature Grans (Abs) 0.05 (H) 0.00 - 0.04 K/uL    Lymph # 1.9 1.0 - 4.8 K/uL    Mono # 0.7 0.3 - 1.0 K/uL    Eos # 1.2 (H) 0.0 - 0.5 K/uL    Baso # 0.14 0.00 - 0.20 K/uL    nRBC 0 0 /100 WBC    Gran% 56.8 38.0 - 73.0 %    Lymph% 20.5 18.0 - 48.0 %    Mono% 7.5 4.0 - 15.0 %    Eosinophil% 13.1 (H) 0.0 - 8.0 %    Basophil% 1.5 0.0 - 1.9 %    Differential Method Automated    Comprehensive metabolic panel   Result Value Ref Range    Sodium 140 136 - 145 mmol/L    Potassium 4.1 3.5 - 5.1 mmol/L    Chloride 106 95 - 110 mmol/L    CO2 24 23 - 29 mmol/L    Glucose 85 70 - 110 mg/dL    BUN, Bld 9 6 - 20 mg/dL    Creatinine 0.9 0.5 - 1.4 mg/dL    Calcium 8.8 8.7 - 10.5 mg/dL    Total Protein 6.3 6.0 - 8.4 g/dL    Albumin 3.3 (L) 3.5 - 5.2 g/dL    Total Bilirubin 0.3 0.1 - 1.0 mg/dL    Alkaline Phosphatase 65 55 - 135 U/L    AST 16 10 - 40 U/L    ALT 12 10 - 44 U/L    Anion Gap 10 8 - 16 mmol/L    eGFR if African American >60 >60 mL/min/1.73 m^2    eGFR if non African American >60 >60 mL/min/1.73 m^2   Troponin I #1   Result Value Ref Range    Troponin I 0.007 0.000 - 0.026 ng/mL   B-Type natriuretic peptide (BNP)   Result Value Ref Range    BNP 33 0 - 99 pg/mL         Imaging Results:  Imaging Results          X-Ray Chest PA And Lateral (Final result)  Result time 12/03/19 10:57:54    Final result by Tj  ROGELIO Figueroa MD (12/03/19 10:57:54)                 Impression:      No acute abnormality.      Electronically signed by: Tj Henry  Date:    12/03/2019  Time:    10:57             Narrative:    EXAMINATION:  XR CHEST PA AND LATERAL    CLINICAL HISTORY:  Chest Pain;    TECHNIQUE:  PA and lateral views of the chest were performed.    COMPARISON:  10/24/2019    FINDINGS:  The lungs are clear, with normal appearance of pulmonary vasculature and no pleural effusion or pneumothorax.    The cardiac silhouette is normal in size. The hilar and mediastinal contours are unremarkable.    Bones are intact.                                 The EKG was ordered, reviewed, and independently interpreted by the ED provider.  Interpretation time: 10:22 AM  Rate: 65 BPM  Rhythm: normal sinus rhythm  Interpretation: Possible left atrial enlargement. No acute ST/T wave changes. No STEMI.           The Emergency Provider reviewed the vital signs and test results, which are outlined above.     ED Discussion     12:17 PM: Discussed pt's case with Dr. Rajinder Shahid, Cardiologist, who states he will come talk to the pt in the ED.    1:01 PM: Discussed pt's case with Dr. Shahid who states that the pt can be discharged home and f/u with cardiology outpatient. Dr. Shahid reports that he will order an outpatient stress test, refill pt's medications, and maximize medications for CP treatment. Dr. Shahid states that the pt does not need a second troponin level in the ED.     1:09 PM: Reassessed pt at this time.  Pt states her condition has improved at this time. Discussed with pt all pertinent ED information and results. Discussed pt dx and plan of tx. Gave pt all f/u and return to the ED instructions. All questions and concerns were addressed at this time. Pt expresses understanding of information and instructions, and is comfortable with plan to discharge. Pt is stable for discharge.    I have discussed with patient and/or family/caretaker chest pain  precautions, specifically to return for worsening chest pain, shortness of breath, fever, or any concern.  I have low suspicion for cardiopulmonary, vascular, infectious, respiratory, or other emergent medical condition based on my evaluation in the ED.    I discussed with patient and/or family/caretaker that evaluation in the ED does not suggest any emergent or life threatening medical conditions requiring immediate intervention beyond what was provided in the ED, and I believe patient is safe for discharge.  Regardless, an unremarkable evaluation in the ED does not preclude the development or presence of a serious of life threatening condition. As such, patient was instructed to return immediately for any worsening or change in current symptoms.         Medical Decision Making:   Clinical Tests:   Lab Tests: Ordered and Reviewed  Radiological Study: Ordered and Reviewed  Medical Tests: Ordered and Reviewed           ED Medication(s):  Medications   aspirin tablet 325 mg (325 mg Oral Not Given 12/3/19 1030)       New Prescriptions    No medications on file       Follow-up Information     Toby Morrow MD In 3 days.    Specialties:  Cardiology, INTERVENTIONAL CARDIOLOGY  Contact information:  [id number]  [place] [address]  [phone]                       Scribe Attestation:   Scribe #1: I performed the above scribed service and the documentation accurately describes the services I performed. I attest to the accuracy of the note.     Attending:   Physician Attestation Statement for Scribe #1: I, Lenin Barnhart Do, MD, personally performed the services described in this documentation, as scribed by Iraida Clark, in my presence, and it is both accurate and complete.           Clinical Impression       ICD-10-CM ICD-9-CM   1. Chest pain, unspecified type R07.9 786.50   2. Chest pain R07.9 786.50       Disposition:   Disposition: Discharged  Condition: Stable         Lenin Barnhart Do, MD  12/03/19  2005

## 2019-12-03 NOTE — ASSESSMENT & PLAN NOTE
-patient reports constant chest tightness since recent PCI of RCA  -Optimize medical therapy for CAD  -MPI stress test as OP to evaluate for ischemia due to  LAD  -F/U in Clinic next week after Stress test to make further treatment decisions  -Continue ASA, Statin, Plavix, BB, Ranexa, Norvasc, NTG patch

## 2019-12-03 NOTE — CONSULTS
"[facility]  Cardiology  Consult Note    Patient Name: Eunice Talavera  MRN: [id number]  Admission Date: 12/3/2019  Hospital Length of Stay: 0 days  Code Status: No Order   Attending Provider: No att. providers found   Consulting Provider: JENNA Cook  Primary Care Physician: Yeison Lei MD  Principal Problem:<principal problem not specified>    Patient information was obtained from patient, spouse/SO, relative(s), past medical records and ER records.     Inpatient consult to Cardiology  Consult performed by: JENNA Williamson  Consult ordered by: Lenin Barnhart Do, MD        Subjective:     Chief Complaint:  Chest pain     HPI:   Eunice Talavera is a 49 year old female who presented to [facility] ED due to ongoing chest tightness since recent PCI per Dr. Morrow. Her current medical conditions include CAD s/p PCI of RCA,  of LAD, HTN, HLP, ex-smoker. ED workup revealed BNP 33, Troponin 0.007, K+ 4.1, Cr 0.9. /88 upon arrival to ED. EMS administered NTG SL x 1 dose en route with some improvement in left sided chest tightness. Cardiology consulted to assist with medical management. Chart reviewed, patient seen and examined. She reports prior to PCI of RCA she had exertional chest discomfort. Since PCI of RCA she reports constant "chest tightness" to left chest wall which worsens at times and then improves. She denies that she has any chest pain free days since PCI of RCA. She was seen by Dr. Morrow and Ranexa increased to 1000 mg BID without any improvements in her symptoms. She has quit smoking about 2 weeks ago, congratulations provided. Discussed with patient the importance of MPI stress test to evaluate for ischemia and will decide upon further management options afterwards. Will optimize her medical therapy for CAD as well by adding Norvasc 2.5 mg daily, NTG patch daily, and Lopressor 25 mg BID. Will have her seen in clinic next week for further evaluation. "     Past Medical History:   Diagnosis Date    Anxiety     Depression     GERD (gastroesophageal reflux disease)     Hyperlipidemia        Past Surgical History:   Procedure Laterality Date    BARIATRIC SURGERY      Gastric bypass    breast augmentation      BREAST SURGERY Bilateral     10/2016    HYSTERECTOMY      LEFT HEART CATHETERIZATION Left 11/1/2019    Procedure: CATHETERIZATION, HEART, LEFT;  Surgeon: Toby Morrow MD;  Location: [facility] CATH LAB;  Service: Cardiology;  Laterality: Left;  pt informed 11am start time    LEFT HEART CATHETERIZATION Left 11/8/2019    Procedure: CATHETERIZATION, HEART, LEFT;  Surgeon: Toby Morrow MD;  Location: [facility] CATH LAB;  Service: Cardiology;  Laterality: Left;    TUBAL LIGATION         Review of patient's allergies indicates:   Allergen Reactions    Lortab [hydrocodone-acetaminophen] Itching    Morphine Itching       No current facility-administered medications on file prior to encounter.      Current Outpatient Medications on File Prior to Encounter   Medication Sig    aspirin (ECOTRIN) 81 MG EC tablet Take 1 tablet (81 mg total) by mouth once daily.    atorvastatin (LIPITOR) 20 MG tablet Take 1 tablet (20 mg total) by mouth once daily.    clopidogrel (PLAVIX) 75 mg tablet Take 1 tablet (75 mg total) by mouth once daily.    nicotine (NICODERM CQ) 21 mg/24 hr Place 1 patch onto the skin once daily.    ranolazine (RANEXA) 1,000 mg Tb12 Take 1 tablet (1,000 mg total) by mouth 2 (two) times daily.    valACYclovir (VALTREX) 1000 MG tablet Take 2 tablets (2,000 mg total) by mouth every 12 (twelve) hours. for 2 doses    [DISCONTINUED] ALPRAZolam (XANAX) 0.5 MG tablet Take 1 tablet (0.5 mg total) by mouth 2 (two) times daily as needed for Anxiety.    [DISCONTINUED] cholecalciferol, vitamin D3, 50,000 unit capsule Take 1 capsule by mouth every 7 days.    [DISCONTINUED] esomeprazole (NEXIUM) 40 MG capsule Take 1 capsule by mouth once daily.     [DISCONTINUED] mupirocin (BACTROBAN) 2 % ointment APPLY TOPICALLY TO THE AFFECTED AREA TWICE DAILY (Patient not taking: Reported on 2019)    [DISCONTINUED] nicotine polacrilex 2 MG Lozg Take 1 lozenge (2 mg total) by mouth as needed.    [DISCONTINUED] varenicline (CHANTIX STARTING MONTH BOX) 0.5 mg (11)- 1 mg (42) tablet Take one 0.5mg tab by mouth once daily X3 days,then increase to one 0.5mg tab twice daily X4 days,then increase to one 1mg tab twice daily    [DISCONTINUED] venlafaxine (EFFEXOR-XR) 37.5 MG 24 hr capsule TAKE 1 CAPSULE BY MOUTH ONCE DAILY (Patient not taking: Reported on 2019)     Family History     Problem Relation (Age of Onset)    Breast cancer Maternal Grandmother, Maternal Aunt, Maternal Cousin    Diabetes Paternal Grandmother    Heart disease Father, Paternal Grandfather        Tobacco Use    Smoking status: Former Smoker     Packs/day: 1.00     Years: 20.00     Pack years: 20.00     Types: Cigarettes     Last attempt to quit: 2019     Years since quittin.0    Smokeless tobacco: Never Used   Substance and Sexual Activity    Alcohol use: Yes     Comment: occasional    Drug use: No    Sexual activity: Yes     Partners: Male     Review of Systems   Constitution: Positive for malaise/fatigue.   HENT: Negative for hearing loss and hoarse voice.    Eyes: Negative for blurred vision and visual disturbance.   Cardiovascular: Positive for chest pain. Negative for claudication, dyspnea on exertion, irregular heartbeat, leg swelling, near-syncope, orthopnea, palpitations, paroxysmal nocturnal dyspnea and syncope.   Respiratory: Negative for cough, hemoptysis, shortness of breath, sleep disturbances due to breathing, snoring and wheezing.    Endocrine: Negative for cold intolerance and heat intolerance.   Hematologic/Lymphatic: Does not bruise/bleed easily.   Skin: Negative for color change, dry skin and nail changes.   Musculoskeletal: Negative for arthritis, back pain,  joint pain and myalgias.   Gastrointestinal: Negative for bloating, abdominal pain, constipation, nausea and vomiting.   Genitourinary: Negative for dysuria, flank pain, hematuria and hesitancy.   Neurological: Negative for headaches, light-headedness, loss of balance, numbness, paresthesias and weakness.   Psychiatric/Behavioral: Negative for altered mental status.   Allergic/Immunologic: Negative for environmental allergies.     Objective:     Vital Signs (Most Recent):  Temp: 97.5 °F (36.4 °C) (12/03/19 1300)  Pulse: 71 (12/03/19 1327)  Resp: 18 (12/03/19 1327)  BP: 133/77 (12/03/19 1300)  SpO2: 100 % (12/03/19 1300) Vital Signs (24h Range):  Temp:  [97.5 °F (36.4 °C)] 97.5 °F (36.4 °C)  Pulse:  [67-82] 71  Resp:  [18-20] 18  SpO2:  [97 %-100 %] 100 %  BP: (133-160)/(77-89) 133/77     Weight: 74.8 kg (165 lb)  Body mass index is 28.32 kg/m².    SpO2: 100 %  O2 Device (Oxygen Therapy): room air    No intake or output data in the 24 hours ending 12/03/19 1330    Lines/Drains/Airways     None                 Physical Exam   Constitutional: She is oriented to person, place, and time. She appears well-developed and well-nourished. No distress.   HENT:   Head: Normocephalic and atraumatic.   Eyes: Pupils are equal, round, and reactive to light.   Neck: Normal range of motion and full passive range of motion without pain. Neck supple. No JVD present.   Cardiovascular: Normal rate, regular rhythm, S1 normal, S2 normal and intact distal pulses. PMI is not displaced. Exam reveals no distant heart sounds.   No murmur heard.  Pulses:       Radial pulses are 2+ on the right side, and 2+ on the left side.        Dorsalis pedis pulses are 2+ on the right side, and 2+ on the left side.   +elevated BP in ED  +left chest wall heaviness/tightness constant in ED   Pulmonary/Chest: Effort normal and breath sounds normal. No accessory muscle usage. No respiratory distress. She has no decreased breath sounds. She has no wheezes. She  has no rales.   Abdominal: Soft. Bowel sounds are normal. She exhibits no distension.   Musculoskeletal: Normal range of motion. She exhibits no edema.        Right ankle: She exhibits no swelling.        Left ankle: She exhibits no swelling.   Neurological: She is alert and oriented to person, place, and time.   Skin: Skin is warm and dry. She is not diaphoretic. No cyanosis. Nails show no clubbing.   Psychiatric: She has a normal mood and affect. Her speech is normal and behavior is normal. Judgment and thought content normal. Cognition and memory are normal.   Nursing note and vitals reviewed.      Significant Labs:   BMP:   Recent Labs   Lab 12/03/19  1035   GLU 85      K 4.1      CO2 24   BUN 9   CREATININE 0.9   CALCIUM 8.8   , CBC   Recent Labs   Lab 12/03/19  1035   WBC 9.08   HGB 12.9   HCT 39.4      , Troponin   Recent Labs   Lab 12/03/19  1035   TROPONINI 0.007    and All pertinent lab results from the last 24 hours have been reviewed.    Significant Imaging: Echocardiogram:   2D echo with color flow doppler:   Results for orders placed or performed in visit on 10/24/19   2D echo with color flow doppler   Result Value Ref Range    QEF 60 55 - 65    Diastolic Dysfunction No     Narrative    Date of Procedure: 11/07/2019        TEST DESCRIPTION       Aorta: The aortic root is normal in size, measuring 2.3 cm at sinotubular junction and 2.4 cm at Sinuses of Valsalva. The proximal ascending aorta is normal in size, measuring 2.8 cm across.     Left Atrium: The left atrial volume index is normal, measuring 22.71 cc/m2.     Left Ventricle: The left ventricle is normal in size, with an end-diastolic diameter of 4.4 cm, and an end-systolic diameter of 3.0 cm. LV wall thickness is normal, with the septum measuring 1.2 cm and the posterior wall measuring 1.1 cm across. Relative   wall thickness was increased at 0.50, and the LV mass index was increased at 133.3 g/m2 consistent with concentric  left ventricular hypertrophy. There are no regional wall motion abnormalities. Left ventricular systolic function appears normal. Visually   estimated ejection fraction is 60-65%. The LV Doppler derived stroke volume equals 63.0 ccs.     Diastolic indices: E wave velocity 0.6 m/s, E/A ratio 1.1,  msec., E/e' ratio(avg) 4. Diastolic function is normal.     Right Atrium: The right atrium is normal in size, measuring 4.8 cm in length and 2.7 cm in width in the apical view.     Right Ventricle: The right ventricle is normal in size measuring 2.3 cm at the base in the apical right ventricle-focused view. Global right ventricular systolic function appears normal. Tricuspid annular plane systolic excursion (TAPSE) is 2.4 cm.     Aortic Valve:  Aortic valve is normal in structure with normal leaflet mobility.     Mitral Valve:  Mitral valve is normal in structure with normal leaflet mobility.     Tricuspid Valve:  Tricuspid valve is normal in structure with normal leaflet mobility.     Pulmonary Valve:  Pulmonary valve is normal in structure with normal leaflet mobility.     Intracavitary: There is no evidence of pericardial effusion, intracavity mass, thrombi, or vegetation.         CONCLUSIONS     1 - Concentric hypertrophy.     2 - No wall motion abnormalities.     3 - Normal left ventricular systolic function (EF 60-65%).     4 - Normal left ventricular diastolic function.     5 - Normal right ventricular systolic function .             This document has been electronically    SIGNED BY: Toby Morrow MD On: 11/08/2019 15:29    and X-Ray: CXR: X-Ray Chest 1 View (CXR): No results found for this visit on 12/03/19. and X-Ray Chest PA and Lateral (CXR):   Results for orders placed or performed during the hospital encounter of 12/03/19   X-Ray Chest PA And Lateral    Narrative    EXAMINATION:  XR CHEST PA AND LATERAL    CLINICAL HISTORY:  Chest Pain;    TECHNIQUE:  PA and lateral views of the chest were  performed.    COMPARISON:  10/24/2019    FINDINGS:  The lungs are clear, with normal appearance of pulmonary vasculature and no pleural effusion or pneumothorax.    The cardiac silhouette is normal in size. The hilar and mediastinal contours are unremarkable.    Bones are intact.      Impression    No acute abnormality.      Electronically signed by: Tj Figueroa  Date:    12/03/2019  Time:    10:57     Assessment and Plan:     S/P coronary artery stent placement  -Continue ASA, Statin, Plavix, BB, Norvasc, NTG patch, Ranexa    Smoker  -continue smoking cessation    Angina pectoris  -patient reports constant chest tightness since recent PCI of RCA  -Optimize medical therapy for CAD  -MPI stress test as OP to evaluate for ischemia due to  LAD  -F/U in Clinic next week after Stress test to make further treatment decisions  -Continue ASA, Statin, Plavix, BB, Ranexa, Norvasc, NTG patch    Pure hypercholesterolemia  -continue Lipitor  -Repeat FLP in next 1-2 months for further treatment decisions        VTE Risk Mitigation (From admission, onward)    None          Thank you for your consult. I will follow-up with patient. Please contact us if you have any additional questions.    CLEMENTINA CookC  Cardiology   [facility]

## 2019-12-03 NOTE — ED NOTES
"Patient c/o "left sided chest heaviness and tightness and nausea" beginning this morning on her way to work. Patient denies SOB.    Patient placed on cardiac monitor, continuous pulse oximetry and automatic blood pressure cuff. Bed placed in low locked position, side rails up x 2, call light is within reach of patient, will continue to monitor.    Patient identifies self as Eunice Talavera      LOC: The patient is awake, alert and aware of environment with an appropriate affect, the patient is oriented x 3 and speaking appropriately.  APPEARANCE: Patient resting comfortably and in no acute distress, patient is clean and well groomed, patient's clothing is properly fastened.  SKIN: The skin is warm and dry, color consistent with ethnicity, patient has normal skin turgor and moist mucus membranes, skin intact, no breakdown or bruising noted.  MUSCULOSKELETAL: Patient moving all extremities well, no obvious swelling or deformities noted.  RESPIRATORY: Airway is open and patent, respirations are spontaneous, patient has a normal effort and rate, no accessory muscle use noted.  CARDIAC: Patient has a normal rate and rhythm, no peripheral edema noted, capillary refill < 3 seconds.  ABDOMEN: Soft and non tender to palpation, no distention noted.  NEUROLOGIC: PERRL, 3 mm bilaterally, eyes open spontaneously, behavior appropriate to situation, follows commands, facial expression symmetrical, bilateral hand grasp equal and even, purposeful motor response noted, normal sensation in all extremities when touched with a finger.    "

## 2019-12-04 ENCOUNTER — CLINICAL SUPPORT (OUTPATIENT)
Dept: SMOKING CESSATION | Facility: CLINIC | Age: 49
End: 2019-12-04
Payer: COMMERCIAL

## 2019-12-04 ENCOUNTER — PES CALL (OUTPATIENT)
Dept: ADMINISTRATIVE | Facility: CLINIC | Age: 49
End: 2019-12-04

## 2019-12-04 DIAGNOSIS — F17.200 NICOTINE DEPENDENCE: Primary | ICD-10-CM

## 2019-12-04 PROCEDURE — 99402 PR PREVENT COUNSEL,INDIV,30 MIN: ICD-10-PCS | Mod: S$GLB,,, | Performed by: GENERAL PRACTICE

## 2019-12-04 PROCEDURE — 99402 PREV MED CNSL INDIV APPRX 30: CPT | Mod: S$GLB,,, | Performed by: GENERAL PRACTICE

## 2019-12-04 NOTE — PROGRESS NOTES
Individual Follow-Up Form    12/4/2019    Quit Date: 11-    Clinical Status of Patient: Outpatient    Length of Service: 30 minutes    Continuing Medication: yes  Chantix     Target Symptoms: Withdrawal and medication side effects. The following were  rated moderate (3) to severe (4) on TCRS:  · Moderate (3): nervous, restless  · Severe (4): none    Comments: Spoke with patient at length by telephone in regards to her scheduled smoking cessation appointment. She stated that she went to the emergency room yesterday due to not feeling well with an elevated blood pressure. She was sent home to rest and will have a nuclear stress test on Friday. She is unable to attend her appointment today and requested a telephone counseling appointment. Discussed her continued progress and remaining tobacco free. She states that she is on Day 9 of Chantix but did not take her medication last night nor this morning because she wasn't feeling well. She states that she is nervous about her health but is proud that she was able to say that she no longer smokes. She states that she is feeling restless at home with minimal urges. The patient denies any abnormal behavioral or mental changes at this time. Appointment rescheduled for 1 week. Will continue to encourage and monitor progress.    Diagnosis: F17.200    Next Visit: 1 week

## 2019-12-06 ENCOUNTER — HOSPITAL ENCOUNTER (OUTPATIENT)
Dept: RADIOLOGY | Facility: HOSPITAL | Age: 49
Discharge: HOME | End: 2019-12-06
Attending: NURSE PRACTITIONER
Payer: COMMERCIAL

## 2019-12-06 ENCOUNTER — CLINICAL SUPPORT (OUTPATIENT)
Dept: CARDIOLOGY | Facility: CLINIC | Age: 49
End: 2019-12-06
Attending: NURSE PRACTITIONER
Payer: COMMERCIAL

## 2019-12-06 DIAGNOSIS — I25.111 CORONARY ARTERY DISEASE INVOLVING NATIVE CORONARY ARTERY OF NATIVE HEART WITH ANGINA PECTORIS WITH DOCUMENTED SPASM: ICD-10-CM

## 2019-12-06 LAB — DIASTOLIC DYSFUNCTION: NO

## 2019-12-06 PROCEDURE — 78452 NM MULTI PHARM STRESS CARDIAC COMPONENT: ICD-10-PCS | Mod: 26,,, | Performed by: INTERNAL MEDICINE

## 2019-12-06 PROCEDURE — A9502 TC99M TETROFOSMIN: HCPCS

## 2019-12-06 PROCEDURE — 78452 HT MUSCLE IMAGE SPECT MULT: CPT | Mod: 26,,, | Performed by: INTERNAL MEDICINE

## 2019-12-06 PROCEDURE — 93015 CV STRESS TEST SUPVJ I&R: CPT | Mod: S$GLB,,, | Performed by: INTERNAL MEDICINE

## 2019-12-06 PROCEDURE — 93015 NM MULTI PHARM STRESS CARDIAC COMPONENT: ICD-10-PCS | Mod: S$GLB,,, | Performed by: INTERNAL MEDICINE

## 2019-12-09 ENCOUNTER — TELEPHONE (OUTPATIENT)
Dept: CARDIOLOGY | Facility: CLINIC | Age: 49
End: 2019-12-09

## 2019-12-09 NOTE — TELEPHONE ENCOUNTER
I spoke with Miss Dawson and let her know that I will be faxing her stress test results to Dr. Jayla Carrera's office at [facility]. Fax # [phone]

## 2019-12-09 NOTE — TELEPHONE ENCOUNTER
----- Message from JENNA Williamson sent at 12/8/2019  8:38 AM CST -----  Please call patient    I have reviewed her stress test, it did not reveal any ischemia.    Keep follow up appt this week    Brittany

## 2019-12-09 NOTE — TELEPHONE ENCOUNTER
----- Message from Shannan Fink sent at 12/9/2019  8:38 AM CST -----  Contact: pt  States she needs to get her stress test results sent to Dr Jayla Zaman ([phone]). She doesn't have the fax#. She has an appt with her today at 1:30. Please call pt [phone]. Thank you

## 2019-12-11 ENCOUNTER — CLINICAL SUPPORT (OUTPATIENT)
Dept: SMOKING CESSATION | Facility: CLINIC | Age: 49
End: 2019-12-11
Payer: COMMERCIAL

## 2019-12-11 ENCOUNTER — DOCUMENTATION ONLY (OUTPATIENT)
Dept: CARDIOLOGY | Facility: CLINIC | Age: 49
End: 2019-12-11

## 2019-12-11 DIAGNOSIS — F17.200 NICOTINE DEPENDENCE: Primary | ICD-10-CM

## 2019-12-11 PROCEDURE — 99404 PREV MED CNSL INDIV APPRX 60: CPT | Mod: S$GLB,,, | Performed by: GENERAL PRACTICE

## 2019-12-11 PROCEDURE — 99404 PR PREVENT COUNSEL,INDIV,60 MIN: ICD-10-PCS | Mod: S$GLB,,, | Performed by: GENERAL PRACTICE

## 2019-12-11 RX ORDER — VARENICLINE TARTRATE 0.5 (11)-1
KIT ORAL
COMMUNITY
End: 2020-03-16

## 2019-12-11 NOTE — PROGRESS NOTES
Individual Follow-Up Form    12/11/2019    Quit Date: 11-    Clinical Status of Patient: Outpatient    Length of Service: 60 minutes    Continuing Medication: yes  Chantix     Target Symptoms: Withdrawal and medication side effects. The following were  rated moderate (3) to severe (4) on TCRS:  · Moderate (3): nervous, anxious, restless, desire tobacco  · Severe (4): none    Comments: Patient was seen today for a smoking cessation progress update. The patient remains on the prescribed tobacco cessation medication regimen of 1 mg Chantix BID without any negative side effects at this time. She remains tobacco free at this time. She states that she is scheduled to have open heart surgery on Monday due to 100% blockage. She states that she is very anxious and nervous which is causing her to have intense cravings and desires to smoke. Discussed relaxation techniques and stress management. Discussed coping strategies. Discussed follow up schedule as she stated that she will be unable to drive for 4 weeks. Will follow up by telephone until she is more mobile. The patient denies any abnormal behavioral or mental changes at this time. Will continue to encourage and monitor progress.    Diagnosis: F17.200    Next Visit: 1 week

## 2019-12-11 NOTE — PROGRESS NOTES
Patient presented to ED with complaints of chest pain last week.     Her medical therapy for CAD was optimized as much as possible at that time. She completed MPI stress test which did not reveal any ischemia.     Cath Lab images sent via CD to Dr. Go, CT surgery, in [place] to review for possible LIMA-LAD bypass if possible. Further recommendations to follow.     JENNA Varela  [facility]

## 2019-12-13 ENCOUNTER — TELEPHONE (OUTPATIENT)
Dept: INTERNAL MEDICINE | Facility: CLINIC | Age: 49
End: 2019-12-13

## 2019-12-13 RX ORDER — ALPRAZOLAM 0.25 MG/1
0.25 TABLET ORAL 2 TIMES DAILY PRN
Qty: 20 TABLET | Refills: 0 | Status: SHIPPED | OUTPATIENT
Start: 2019-12-13 | End: 2020-03-16 | Stop reason: SDUPTHER

## 2019-12-13 NOTE — TELEPHONE ENCOUNTER
----- Message from Margaret Carreon sent at 12/13/2019 12:08 PM CST -----  Contact: Pt  Pt is calling the staff regarding the pt is having open Heart surgery on this Monday and the pt would like to have something for the pt nerves.     Pt stated that the pt have been on Xanax before.    Pt call back [phone]    Thanks

## 2020-01-13 ENCOUNTER — CLINICAL SUPPORT (OUTPATIENT)
Dept: SMOKING CESSATION | Facility: CLINIC | Age: 50
End: 2020-01-13
Payer: COMMERCIAL

## 2020-01-13 DIAGNOSIS — F17.200 NICOTINE DEPENDENCE: Primary | ICD-10-CM

## 2020-01-13 PROCEDURE — 99407 PR TOBACCO USE CESSATION INTENSIVE >10 MINUTES: ICD-10-PCS | Mod: S$GLB,,, | Performed by: GENERAL PRACTICE

## 2020-01-13 PROCEDURE — 99407 BEHAV CHNG SMOKING > 10 MIN: CPT | Mod: S$GLB,,, | Performed by: GENERAL PRACTICE

## 2020-01-13 RX ORDER — IBUPROFEN 200 MG
1 TABLET ORAL DAILY
Qty: 14 PATCH | Refills: 1 | Status: SHIPPED | OUTPATIENT
Start: 2020-01-13 | End: 2020-07-28 | Stop reason: ALTCHOICE

## 2020-03-16 ENCOUNTER — OFFICE VISIT (OUTPATIENT)
Dept: INTERNAL MEDICINE | Facility: CLINIC | Age: 50
End: 2020-03-16
Payer: COMMERCIAL

## 2020-03-16 VITALS
WEIGHT: 167.31 LBS | HEART RATE: 76 BPM | SYSTOLIC BLOOD PRESSURE: 120 MMHG | TEMPERATURE: 98 F | HEIGHT: 64 IN | BODY MASS INDEX: 28.56 KG/M2 | DIASTOLIC BLOOD PRESSURE: 82 MMHG

## 2020-03-16 DIAGNOSIS — Z95.1 S/P CABG X 2: Primary | ICD-10-CM

## 2020-03-16 DIAGNOSIS — F41.1 GAD (GENERALIZED ANXIETY DISORDER): ICD-10-CM

## 2020-03-16 PROCEDURE — 90686 FLU VACCINE (QUAD) GREATER THAN OR EQUAL TO 3YO PRESERVATIVE FREE IM: ICD-10-PCS | Mod: S$GLB,,, | Performed by: FAMILY MEDICINE

## 2020-03-16 PROCEDURE — 99999 PR PBB SHADOW E&M-EST. PATIENT-LVL III: ICD-10-PCS | Mod: PBBFAC,,, | Performed by: FAMILY MEDICINE

## 2020-03-16 PROCEDURE — 3008F PR BODY MASS INDEX (BMI) DOCUMENTED: ICD-10-PCS | Mod: CPTII,S$GLB,, | Performed by: FAMILY MEDICINE

## 2020-03-16 PROCEDURE — 99214 PR OFFICE/OUTPT VISIT, EST, LEVL IV, 30-39 MIN: ICD-10-PCS | Mod: 25,S$GLB,, | Performed by: FAMILY MEDICINE

## 2020-03-16 PROCEDURE — 90686 IIV4 VACC NO PRSV 0.5 ML IM: CPT | Mod: S$GLB,,, | Performed by: FAMILY MEDICINE

## 2020-03-16 PROCEDURE — 3008F BODY MASS INDEX DOCD: CPT | Mod: CPTII,S$GLB,, | Performed by: FAMILY MEDICINE

## 2020-03-16 PROCEDURE — 90471 IMMUNIZATION ADMIN: CPT | Mod: S$GLB,,, | Performed by: FAMILY MEDICINE

## 2020-03-16 PROCEDURE — 99999 PR PBB SHADOW E&M-EST. PATIENT-LVL III: CPT | Mod: PBBFAC,,, | Performed by: FAMILY MEDICINE

## 2020-03-16 PROCEDURE — 90471 FLU VACCINE (QUAD) GREATER THAN OR EQUAL TO 3YO PRESERVATIVE FREE IM: ICD-10-PCS | Mod: S$GLB,,, | Performed by: FAMILY MEDICINE

## 2020-03-16 PROCEDURE — 99214 OFFICE O/P EST MOD 30 MIN: CPT | Mod: 25,S$GLB,, | Performed by: FAMILY MEDICINE

## 2020-03-16 RX ORDER — ESOMEPRAZOLE MAGNESIUM 40 MG/1
40 CAPSULE, DELAYED RELEASE ORAL DAILY PRN
COMMUNITY
Start: 2019-09-16 | End: 2020-09-25 | Stop reason: SDUPTHER

## 2020-03-16 RX ORDER — DICYCLOMINE HYDROCHLORIDE 20 MG/1
1 TABLET ORAL DAILY
COMMUNITY
Start: 2020-03-04

## 2020-03-16 RX ORDER — ALPRAZOLAM 0.25 MG/1
0.25 TABLET ORAL 2 TIMES DAILY PRN
Qty: 20 TABLET | Refills: 0 | Status: SHIPPED | OUTPATIENT
Start: 2020-03-16 | End: 2020-04-24 | Stop reason: SDUPTHER

## 2020-03-16 RX ORDER — ESCITALOPRAM OXALATE 10 MG/1
10 TABLET ORAL DAILY
Qty: 30 TABLET | Refills: 3 | Status: SHIPPED | OUTPATIENT
Start: 2020-03-16 | End: 2020-04-24

## 2020-03-16 RX ORDER — GABAPENTIN 300 MG/1
3 CAPSULE ORAL DAILY
COMMUNITY
Start: 2020-02-10 | End: 2020-07-28 | Stop reason: SDUPTHER

## 2020-03-16 RX ORDER — ATORVASTATIN CALCIUM 80 MG/1
80 TABLET, FILM COATED ORAL DAILY
COMMUNITY
Start: 2020-02-19 | End: 2021-02-18

## 2020-03-16 RX ORDER — FUROSEMIDE 20 MG/1
1 TABLET ORAL DAILY PRN
COMMUNITY
Start: 2020-03-04 | End: 2020-04-17 | Stop reason: SDUPTHER

## 2020-03-16 RX ORDER — PROMETHAZINE HYDROCHLORIDE 25 MG/1
TABLET ORAL
COMMUNITY
Start: 2020-03-04 | End: 2020-05-21

## 2020-03-16 RX ORDER — ASPIRIN 81 MG/1
81 TABLET ORAL DAILY
COMMUNITY
Start: 2019-11-03 | End: 2020-11-02

## 2020-03-16 NOTE — PROGRESS NOTES
Subjective:      Patient ID: Eunice Talavera is a 49 y.o. female.    Chief Complaint: Follow-up (heart surgery)    HPI  50 yo pleasant female here for f/u after CABG x 2.  She is followed by outside Cards now.  She is taking her meds, but off anything for anxiety.  Anxiety is up.  She is forgetful/has issues with memory and word finding at times.  Alzheimer's in her family so she is worried about that.    No CP/SOB, maybe occ have some pressure on the chest but hasn't used any nitro.  Trying to keep bowels regular.    Past Medical History:   Diagnosis Date    Anxiety     Depression     GERD (gastroesophageal reflux disease)     Hyperlipidemia      Family History   Problem Relation Age of Onset    Heart disease Father     Breast cancer Maternal Grandmother     Diabetes Paternal Grandmother     Heart disease Paternal Grandfather     Breast cancer Maternal Aunt     Breast cancer Maternal Cousin     Colon cancer Neg Hx      Past Surgical History:   Procedure Laterality Date    BARIATRIC SURGERY      Gastric bypass    breast augmentation      BREAST SURGERY Bilateral     10/2016    CORONARY ARTERY BYPASS GRAFT      HYSTERECTOMY      LEFT HEART CATHETERIZATION Left 2019    Procedure: CATHETERIZATION, HEART, LEFT;  Surgeon: Toby Morrow MD;  Location: [facility] CATH LAB;  Service: Cardiology;  Laterality: Left;  pt informed 11am start time    LEFT HEART CATHETERIZATION Left 2019    Procedure: CATHETERIZATION, HEART, LEFT;  Surgeon: Toby Morrow MD;  Location: [facility] CATH LAB;  Service: Cardiology;  Laterality: Left;    TUBAL LIGATION       Social History     Tobacco Use    Smoking status: Former Smoker     Packs/day: 1.00     Years: 20.00     Pack years: 20.00     Types: Cigarettes     Last attempt to quit: 2019     Years since quittin.3    Smokeless tobacco: Never Used   Substance Use Topics    Alcohol use: Yes     Comment: occasional    Drug use: No       /82  "(BP Location: Left arm, Patient Position: Sitting, BP Method: Large (Manual))   Pulse 76   Temp 98 °F (36.7 °C) (Oral)   Ht 5' 4" (1.626 m)   Wt 75.9 kg (167 lb 5.3 oz)   LMP  (LMP Unknown)   BMI 28.72 kg/m²     Review of Systems   Constitutional: Negative for activity change, appetite change, chills, diaphoresis, fatigue, fever and unexpected weight change.   HENT: Negative for ear pain, hearing loss, postnasal drip, rhinorrhea and tinnitus.    Eyes: Negative for visual disturbance.   Respiratory: Negative for cough, shortness of breath and wheezing.    Cardiovascular: Negative for palpitations and leg swelling.        Pain at chest incision   Gastrointestinal: Negative for abdominal distention.   Genitourinary: Negative for dysuria, frequency, hematuria and urgency.   Musculoskeletal: Negative for back pain and joint swelling.   Neurological: Negative for weakness and headaches.   Hematological: Negative for adenopathy.   Psychiatric/Behavioral: Positive for decreased concentration. Negative for confusion. The patient is nervous/anxious.        Objective:     Physical Exam   Constitutional: She is oriented to person, place, and time. She appears well-developed and well-nourished. No distress.   HENT:   Right Ear: External ear normal.   Left Ear: External ear normal.   Nose: Nose normal.   Mouth/Throat: Oropharynx is clear and moist.   Eyes: Pupils are equal, round, and reactive to light. Conjunctivae are normal.   Neck: Normal range of motion. Neck supple. Carotid bruit is not present.   Cardiovascular: Normal rate, regular rhythm and normal heart sounds.   Pulmonary/Chest: Effort normal and breath sounds normal. No respiratory distress. She has no wheezes. She has no rales.   Abdominal: Soft. Bowel sounds are normal. She exhibits no distension. There is no tenderness. There is no guarding.   Musculoskeletal: She exhibits no edema.   Neurological: She is alert and oriented to person, place, and time. No " cranial nerve deficit.   Skin: Skin is warm and dry. No rash noted.   Psychiatric: She has a normal mood and affect. Her behavior is normal. Judgment and thought content normal.   Nursing note and vitals reviewed.      Lab Results   Component Value Date    WBC 9.08 12/03/2019    HGB 12.9 12/03/2019    HCT 39.4 12/03/2019     12/03/2019    CHOL 203 (H) 11/12/2018    TRIG 109 11/12/2018    HDL 46 11/12/2018    ALT 12 12/03/2019    AST 16 12/03/2019     12/03/2019    K 4.1 12/03/2019     12/03/2019    CREATININE 0.9 12/03/2019    BUN 9 12/03/2019    CO2 24 12/03/2019    TSH 1.406 11/12/2018    INR 1.0 10/24/2019    HGBA1C 4.9 11/12/2018       Assessment:     1. S/P CABG x 2    2. CATINA (generalized anxiety disorder)         Plan:     S/P CABG x 2    CATINA (generalized anxiety disorder)    Other orders  -     escitalopram oxalate (LEXAPRO) 10 MG tablet; Take 1 tablet (10 mg total) by mouth once daily.  Dispense: 30 tablet; Refill: 3  -     Influenza - Quadrivalent (PF)  -     ALPRAZolam (XANAX) 0.25 MG tablet; Take 1 tablet (0.25 mg total) by mouth 2 (two) times daily as needed for Anxiety.  Dispense: 20 tablet; Refill: 0    Start lexapro 10mg daily for anxiety  Xanax PRN  Reviewed outside records//being followed closely by her Cards right now  Will see how memory responds to anxiety treatment  Flu shot today  Follow up in about 1 month (around 4/16/2020).  Telemed check

## 2020-03-19 ENCOUNTER — TELEPHONE (OUTPATIENT)
Dept: SMOKING CESSATION | Facility: CLINIC | Age: 50
End: 2020-03-19

## 2020-04-17 ENCOUNTER — PATIENT MESSAGE (OUTPATIENT)
Dept: INTERNAL MEDICINE | Facility: CLINIC | Age: 50
End: 2020-04-17

## 2020-04-17 RX ORDER — FUROSEMIDE 20 MG/1
20 TABLET ORAL DAILY PRN
Qty: 30 TABLET | Refills: 1 | Status: SHIPPED | OUTPATIENT
Start: 2020-04-17

## 2020-04-24 ENCOUNTER — OFFICE VISIT (OUTPATIENT)
Dept: INTERNAL MEDICINE | Facility: CLINIC | Age: 50
End: 2020-04-24
Payer: COMMERCIAL

## 2020-04-24 DIAGNOSIS — R07.9 CHEST PAIN, UNSPECIFIED TYPE: ICD-10-CM

## 2020-04-24 DIAGNOSIS — F41.1 GAD (GENERALIZED ANXIETY DISORDER): Primary | ICD-10-CM

## 2020-04-24 DIAGNOSIS — Z95.1 S/P CABG X 2: ICD-10-CM

## 2020-04-24 PROCEDURE — 99214 PR OFFICE/OUTPT VISIT, EST, LEVL IV, 30-39 MIN: ICD-10-PCS | Mod: 95,,, | Performed by: FAMILY MEDICINE

## 2020-04-24 PROCEDURE — 99214 OFFICE O/P EST MOD 30 MIN: CPT | Mod: 95,,, | Performed by: FAMILY MEDICINE

## 2020-04-24 RX ORDER — ALPRAZOLAM 0.25 MG/1
0.25 TABLET ORAL 2 TIMES DAILY PRN
Qty: 20 TABLET | Refills: 0 | Status: SHIPPED | OUTPATIENT
Start: 2020-04-24 | End: 2020-09-25 | Stop reason: SDUPTHER

## 2020-04-24 RX ORDER — ESCITALOPRAM OXALATE 20 MG/1
20 TABLET ORAL NIGHTLY
Qty: 30 TABLET | Refills: 3 | Status: SHIPPED | OUTPATIENT
Start: 2020-04-24 | End: 2020-07-28 | Stop reason: SDUPTHER

## 2020-04-24 NOTE — PROGRESS NOTES
Subjective:      Patient ID: Eunice Talavera is a 49 y.o. female.    Chief Complaint:  Med check    HPI  48 yo female presents via telemed for med check.  She is on the lexapro 10mg and feels it is helping but feels like she could use a bit higher of a dose.  She would also like a refill on the Xanax.  She just uses is PRN.  She is having CP with exertion at times.  She doesn't know if its deconditioning but she has appt with Cards she thinks in next few wks.  She has nitro but hasn't used it.  She is having surgery on her elbow next week/Wed and her Cards ok'd her to d/c the plavix x 5 days.    She cont to use nicoderm patches right now PRN and is not smoking.  The patient location is: Home  The chief complaint leading to consultation is: med check  Visit type: audiovisual  Total time spent with patient: 10 mins  Each patient to whom he or she provides medical services by telemedicine is:  (1) informed of the relationship between the physician and patient and the respective role of any other health care provider with respect to management of the patient; and (2) notified that he or she may decline to receive medical services by telemedicine and may withdraw from such care at any time.          Past Medical History:   Diagnosis Date    Anxiety     Depression     GERD (gastroesophageal reflux disease)     Hyperlipidemia      Family History   Problem Relation Age of Onset    Heart disease Father     Breast cancer Maternal Grandmother     Diabetes Paternal Grandmother     Heart disease Paternal Grandfather     Breast cancer Maternal Aunt     Breast cancer Maternal Cousin     Colon cancer Neg Hx      Past Surgical History:   Procedure Laterality Date    BARIATRIC SURGERY      Gastric bypass    breast augmentation      BREAST SURGERY Bilateral     10/2016    CORONARY ARTERY BYPASS GRAFT      HYSTERECTOMY      LEFT HEART CATHETERIZATION Left 11/1/2019    Procedure: CATHETERIZATION, HEART, LEFT;   Surgeon: Toby Morrow MD;  Location: [facility] CATH LAB;  Service: Cardiology;  Laterality: Left;  pt informed 11am start time    LEFT HEART CATHETERIZATION Left 2019    Procedure: CATHETERIZATION, HEART, LEFT;  Surgeon: Toby Morrow MD;  Location: [facility] CATH LAB;  Service: Cardiology;  Laterality: Left;    TUBAL LIGATION       Social History     Tobacco Use    Smoking status: Former Smoker     Packs/day: 1.00     Years: 20.00     Pack years: 20.00     Types: Cigarettes     Last attempt to quit: 2019     Years since quittin.4    Smokeless tobacco: Never Used   Substance Use Topics    Alcohol use: Yes     Comment: occasional    Drug use: No       LMP  (LMP Unknown)     Review of Systems   Constitutional: Positive for unexpected weight change. Negative for activity change.   HENT: Negative for hearing loss, rhinorrhea and trouble swallowing.    Eyes: Negative for discharge and visual disturbance.   Respiratory: Positive for chest tightness. Negative for wheezing.    Cardiovascular: Negative for chest pain and palpitations.   Gastrointestinal: Negative for blood in stool, constipation, diarrhea and vomiting.   Endocrine: Negative for polydipsia and polyuria.   Genitourinary: Negative for difficulty urinating, dysuria, hematuria and menstrual problem.   Musculoskeletal: Positive for arthralgias. Negative for joint swelling and neck pain.   Neurological: Negative for weakness and headaches.   Psychiatric/Behavioral: Positive for dysphoric mood. Negative for confusion.       Objective:     Physical Exam   Constitutional: She is oriented to person, place, and time. She appears well-developed and well-nourished. No distress.   HENT:   Head: Normocephalic.   Eyes: Conjunctivae and EOM are normal.   Neck: Neck supple.   Pulmonary/Chest: Effort normal. No accessory muscle usage. No tachypnea. No respiratory distress.   Neurological: She is alert and oriented to person, place, and time.   Skin: She  is not diaphoretic. No pallor.   Psychiatric: She has a normal mood and affect. Her behavior is normal. Judgment and thought content normal.       Lab Results   Component Value Date    WBC 9.08 12/03/2019    HGB 12.9 12/03/2019    HCT 39.4 12/03/2019     12/03/2019    CHOL 203 (H) 11/12/2018    TRIG 109 11/12/2018    HDL 46 11/12/2018    ALT 12 12/03/2019    AST 16 12/03/2019     12/03/2019    K 4.1 12/03/2019     12/03/2019    CREATININE 0.9 12/03/2019    BUN 9 12/03/2019    CO2 24 12/03/2019    TSH 1.406 11/12/2018    INR 1.0 10/24/2019    HGBA1C 4.9 11/12/2018       Assessment:     1. CATINA (generalized anxiety disorder)    2. S/P CABG x 2    3. Chest pain, unspecified type         Plan:     CATINA (generalized anxiety disorder)    S/P CABG x 2    Chest pain, unspecified type    Other orders  -     escitalopram oxalate (LEXAPRO) 20 MG tablet; Take 1 tablet (20 mg total) by mouth every evening.  Dispense: 30 tablet; Refill: 3  -     ALPRAZolam (XANAX) 0.25 MG tablet; Take 1 tablet (0.25 mg total) by mouth 2 (two) times daily as needed for Anxiety.  Dispense: 20 tablet; Refill: 0    Will increase lexapro to 20mg daily  Refill on Xanax to use PRN  Recommend talking with Cards about her CP c exertion.  F/u with me 1 mos for med check

## 2020-05-07 ENCOUNTER — TELEPHONE (OUTPATIENT)
Dept: SMOKING CESSATION | Facility: CLINIC | Age: 50
End: 2020-05-07

## 2020-05-21 ENCOUNTER — OFFICE VISIT (OUTPATIENT)
Dept: INTERNAL MEDICINE | Facility: CLINIC | Age: 50
End: 2020-05-21
Payer: COMMERCIAL

## 2020-05-21 ENCOUNTER — TELEPHONE (OUTPATIENT)
Dept: INTERNAL MEDICINE | Facility: CLINIC | Age: 50
End: 2020-05-21

## 2020-05-21 VITALS — SYSTOLIC BLOOD PRESSURE: 126 MMHG | DIASTOLIC BLOOD PRESSURE: 73 MMHG

## 2020-05-21 DIAGNOSIS — F41.9 ANXIETY: ICD-10-CM

## 2020-05-21 DIAGNOSIS — L03.114 CELLULITIS OF LEFT UPPER EXTREMITY: ICD-10-CM

## 2020-05-21 DIAGNOSIS — R11.0 NAUSEA: ICD-10-CM

## 2020-05-21 DIAGNOSIS — F41.1 GAD (GENERALIZED ANXIETY DISORDER): Primary | ICD-10-CM

## 2020-05-21 PROCEDURE — 99214 OFFICE O/P EST MOD 30 MIN: CPT | Mod: 95,,, | Performed by: FAMILY MEDICINE

## 2020-05-21 PROCEDURE — 99214 PR OFFICE/OUTPT VISIT, EST, LEVL IV, 30-39 MIN: ICD-10-PCS | Mod: 95,,, | Performed by: FAMILY MEDICINE

## 2020-05-21 RX ORDER — ONDANSETRON 8 MG/1
8 TABLET, ORALLY DISINTEGRATING ORAL EVERY 8 HOURS PRN
Qty: 20 TABLET | Refills: 0 | Status: SHIPPED | OUTPATIENT
Start: 2020-05-21 | End: 2020-09-25 | Stop reason: SDUPTHER

## 2020-05-21 NOTE — PROGRESS NOTES
Subjective:      Patient ID: Eunice Talavera is a 50 y.o. female.    Chief Complaint:  F/u med/anxiety    HPI  51 yo female presents for f/u on anxiety via telemed.  Last visit we increased her lexapro to 20mg, seems to be doing well.  Was having elevated BP and some angina, was seen in UC and given Nitro.  BP came down.  She had visit with her Cards, started low dose norvasc and added Zetia to the lipitor.  Bp yesterday 126/73.  Feeling good.  Had surgery on her L elbow.  Sutures opened on Sat so the PA at the ortho clinic sutured her back up and put her on Bactrim.  Now, 4 days into Abx site is tender/red/swollen.  Minimal drainage now.  No fever/chills.  Some nausea  The patient location is: Home  The chief complaint leading to consultation is: Med check    Visit type: audiovisual    Face to Face time with patient: 10 mins minutes of total time spent on the encounter, which includes face to face time and non-face to face time preparing to see the patient (eg, review of tests), Obtaining and/or reviewing separately obtained history, Documenting clinical information in the electronic or other health record, Independently interpreting results (not separately reported) and communicating results to the patient/family/caregiver, or Care coordination (not separately reported).         Each patient to whom he or she provides medical services by telemedicine is:  (1) informed of the relationship between the physician and patient and the respective role of any other health care provider with respect to management of the patient; and (2) notified that he or she may decline to receive medical services by telemedicine and may withdraw from such care at any time.    Notes:     Past Medical History:   Diagnosis Date    Anxiety     Depression     GERD (gastroesophageal reflux disease)     Hyperlipidemia      Family History   Problem Relation Age of Onset    Heart disease Father     Breast cancer Maternal Grandmother      Diabetes Paternal Grandmother     Heart disease Paternal Grandfather     Breast cancer Maternal Aunt     Breast cancer Maternal Cousin     Colon cancer Neg Hx      Past Surgical History:   Procedure Laterality Date    BARIATRIC SURGERY      Gastric bypass    breast augmentation      BREAST SURGERY Bilateral     10/2016    CORONARY ARTERY BYPASS GRAFT      HYSTERECTOMY      LEFT HEART CATHETERIZATION Left 2019    Procedure: CATHETERIZATION, HEART, LEFT;  Surgeon: Toby Morrow MD;  Location: [facility] CATH LAB;  Service: Cardiology;  Laterality: Left;  pt informed 11am start time    LEFT HEART CATHETERIZATION Left 2019    Procedure: CATHETERIZATION, HEART, LEFT;  Surgeon: Toby Morrow MD;  Location: [facility] CATH LAB;  Service: Cardiology;  Laterality: Left;    TUBAL LIGATION       Social History     Tobacco Use    Smoking status: Former Smoker     Packs/day: 1.00     Years: 20.00     Pack years: 20.00     Types: Cigarettes     Last attempt to quit: 2019     Years since quittin.5    Smokeless tobacco: Never Used   Substance Use Topics    Alcohol use: Yes     Comment: occasional    Drug use: No       /73   LMP  (LMP Unknown)     Review of Systems   Constitutional: Positive for unexpected weight change. Negative for activity change.   HENT: Negative for hearing loss, rhinorrhea and trouble swallowing.    Eyes: Negative for discharge and visual disturbance.   Respiratory: Positive for chest tightness. Negative for wheezing.    Cardiovascular: Positive for chest pain and palpitations.   Gastrointestinal: Negative for blood in stool, constipation, diarrhea and vomiting.   Endocrine: Negative for polydipsia and polyuria.   Genitourinary: Negative for difficulty urinating, dysuria, hematuria and menstrual problem.   Musculoskeletal: Positive for arthralgias and joint swelling. Negative for neck pain.   Neurological: Positive for weakness and headaches.    Psychiatric/Behavioral: Positive for dysphoric mood. Negative for confusion.       Objective:     Physical Exam   Constitutional: She is oriented to person, place, and time. She appears well-developed and well-nourished. No distress.   HENT:   Head: Normocephalic.   Eyes: Conjunctivae and EOM are normal.   Neck: Neck supple.   Pulmonary/Chest: Effort normal. No accessory muscle usage. No tachypnea. No respiratory distress.   Neurological: She is alert and oriented to person, place, and time.   Skin: She is not diaphoretic. There is erythema. No pallor.   L elbow with erythema and puffiness/not clear on video   Psychiatric: She has a normal mood and affect. Her behavior is normal. Judgment and thought content normal.   Nursing note and vitals reviewed.      Lab Results   Component Value Date    WBC 9.08 12/03/2019    HGB 12.9 12/03/2019    HCT 39.4 12/03/2019     12/03/2019    CHOL 203 (H) 11/12/2018    TRIG 109 11/12/2018    HDL 46 11/12/2018    ALT 12 12/03/2019    AST 16 12/03/2019     12/03/2019    K 4.1 12/03/2019     12/03/2019    CREATININE 0.9 12/03/2019    BUN 9 12/03/2019    CO2 24 12/03/2019    TSH 1.406 11/12/2018    INR 1.0 10/24/2019    HGBA1C 4.9 11/12/2018       Assessment:     1. CATINA (generalized anxiety disorder)    2. Cellulitis of left upper extremity    3. Anxiety    4. Nausea         Plan:     CATINA (generalized anxiety disorder)    Cellulitis of left upper extremity    Anxiety    Nausea    Other orders  -     ondansetron (ZOFRAN-ODT) 8 MG TbDL; Take 1 tablet (8 mg total) by mouth every 8 (eight) hours as needed.  Dispense: 20 tablet; Refill: 0    anxiety improving/cont Lexapro  Angina stable//BP improving.  Cont per Cards  Zofran for nausea  Recommend calling surgeon and getting in today for visit to evaluate possible cellulitis  F/u PRN

## 2020-05-21 NOTE — TELEPHONE ENCOUNTER
----- Message from Mohini Johnston sent at 5/21/2020  8:46 AM CDT -----  Contact: patient  Type:  Patient Returning Call    Who Called:patient  Who Left Message for Patient:carly  Does the patient know what this is regarding?:appt  Would the patient rather a call back or a response via [facility]? call  Best Call Back Number:[phone]  Additional Information: please call back  thanks

## 2020-05-22 ENCOUNTER — PATIENT MESSAGE (OUTPATIENT)
Dept: INTERNAL MEDICINE | Facility: CLINIC | Age: 50
End: 2020-05-22

## 2020-05-26 ENCOUNTER — PATIENT MESSAGE (OUTPATIENT)
Dept: INTERNAL MEDICINE | Facility: CLINIC | Age: 50
End: 2020-05-26

## 2020-05-26 RX ORDER — FLUCONAZOLE 150 MG/1
150 TABLET ORAL DAILY
Qty: 1 TABLET | Refills: 0 | Status: SHIPPED | OUTPATIENT
Start: 2020-05-26 | End: 2020-05-27

## 2020-06-02 ENCOUNTER — PATIENT MESSAGE (OUTPATIENT)
Dept: INTERNAL MEDICINE | Facility: CLINIC | Age: 50
End: 2020-06-02

## 2020-06-02 RX ORDER — FLUCONAZOLE 150 MG/1
150 TABLET ORAL DAILY
Qty: 2 TABLET | Refills: 0 | Status: SHIPPED | OUTPATIENT
Start: 2020-06-02 | End: 2020-06-03

## 2020-06-26 ENCOUNTER — TELEPHONE (OUTPATIENT)
Dept: SMOKING CESSATION | Facility: CLINIC | Age: 50
End: 2020-06-26

## 2020-07-28 ENCOUNTER — OFFICE VISIT (OUTPATIENT)
Dept: INTERNAL MEDICINE | Facility: CLINIC | Age: 50
End: 2020-07-28
Payer: COMMERCIAL

## 2020-07-28 VITALS
TEMPERATURE: 98 F | HEIGHT: 64 IN | WEIGHT: 172.19 LBS | SYSTOLIC BLOOD PRESSURE: 122 MMHG | BODY MASS INDEX: 29.4 KG/M2 | HEART RATE: 58 BPM | DIASTOLIC BLOOD PRESSURE: 70 MMHG

## 2020-07-28 DIAGNOSIS — Z12.39 BREAST CANCER SCREENING: ICD-10-CM

## 2020-07-28 DIAGNOSIS — Z00.00 ANNUAL PHYSICAL EXAM: Primary | ICD-10-CM

## 2020-07-28 DIAGNOSIS — Z95.1 S/P CABG X 2: ICD-10-CM

## 2020-07-28 LAB
BILIRUB UR QL STRIP: NEGATIVE
CLARITY UR REFRACT.AUTO: CLEAR
COLOR UR AUTO: YELLOW
GLUCOSE UR QL STRIP: NEGATIVE
HGB UR QL STRIP: NEGATIVE
KETONES UR QL STRIP: NEGATIVE
LEUKOCYTE ESTERASE UR QL STRIP: NEGATIVE
NITRITE UR QL STRIP: NEGATIVE
PH UR STRIP: 5 [PH] (ref 5–8)
PROT UR QL STRIP: NEGATIVE
SP GR UR STRIP: 1.01 (ref 1–1.03)
URN SPEC COLLECT METH UR: NORMAL

## 2020-07-28 PROCEDURE — 81003 URINALYSIS AUTO W/O SCOPE: CPT

## 2020-07-28 PROCEDURE — 99396 PREV VISIT EST AGE 40-64: CPT | Mod: S$GLB,,, | Performed by: FAMILY MEDICINE

## 2020-07-28 PROCEDURE — 3008F PR BODY MASS INDEX (BMI) DOCUMENTED: ICD-10-PCS | Mod: CPTII,S$GLB,, | Performed by: FAMILY MEDICINE

## 2020-07-28 PROCEDURE — 99396 PR PREVENTIVE VISIT,EST,40-64: ICD-10-PCS | Mod: S$GLB,,, | Performed by: FAMILY MEDICINE

## 2020-07-28 PROCEDURE — 99999 PR PBB SHADOW E&M-EST. PATIENT-LVL III: ICD-10-PCS | Mod: PBBFAC,,, | Performed by: FAMILY MEDICINE

## 2020-07-28 PROCEDURE — 3008F BODY MASS INDEX DOCD: CPT | Mod: CPTII,S$GLB,, | Performed by: FAMILY MEDICINE

## 2020-07-28 PROCEDURE — 99999 PR PBB SHADOW E&M-EST. PATIENT-LVL III: CPT | Mod: PBBFAC,,, | Performed by: FAMILY MEDICINE

## 2020-07-28 RX ORDER — ESCITALOPRAM OXALATE 20 MG/1
30 TABLET ORAL NIGHTLY
Qty: 45 TABLET | Refills: 6 | Status: SHIPPED | OUTPATIENT
Start: 2020-07-28 | End: 2021-07-30 | Stop reason: SDUPTHER

## 2020-07-28 RX ORDER — GABAPENTIN 300 MG/1
300 CAPSULE ORAL NIGHTLY
Qty: 30 CAPSULE | Refills: 3 | Status: SHIPPED | OUTPATIENT
Start: 2020-07-28 | End: 2021-01-29

## 2020-07-28 RX ORDER — CARVEDILOL 6.25 MG/1
6.25 TABLET ORAL 2 TIMES DAILY
COMMUNITY
Start: 2020-07-22 | End: 2021-01-29

## 2020-07-28 NOTE — PROGRESS NOTES
Subjective:      Patient ID: Eunice Talavera is a 50 y.o. female.    Chief Complaint: Annual Exam    HPI  49 yo with hx of bariatric surgery, s/p CABG, former smoker, anxiety here for annual.  Asking to increase lexapro, has tolerated med so well but feels she needs a little more benefit.  Also, asking for refill of the neurontin.  Taking one pill nightly at this time.  Followed by specialist for L elbow.  Had to have wound vac and then he had to go back in and clean it all out.  Finally healing.  Cards/Dr. Zaman changed norvasc to Coreg//had swelling in legs.  Not smoking  Some urinary symptoms/unsure if infection.  Not drinking much water at all.    Past Medical History:   Diagnosis Date    Anxiety     CAD (coronary artery disease)     Depression     GERD (gastroesophageal reflux disease)     Hyperlipidemia      Family History   Problem Relation Age of Onset    Heart disease Father     Breast cancer Maternal Grandmother     Diabetes Paternal Grandmother     Heart disease Paternal Grandfather     Breast cancer Maternal Aunt     Breast cancer Maternal Cousin     Colon cancer Neg Hx      Past Surgical History:   Procedure Laterality Date    BARIATRIC SURGERY      Gastric bypass    breast augmentation      BREAST SURGERY Bilateral     10/2016    CORONARY ARTERY BYPASS GRAFT      HYSTERECTOMY      Left Elbow      LEFT HEART CATHETERIZATION Left 11/1/2019    Procedure: CATHETERIZATION, HEART, LEFT;  Surgeon: Toby Morrow MD;  Location: [facility] CATH LAB;  Service: Cardiology;  Laterality: Left;  pt informed 11am start time    LEFT HEART CATHETERIZATION Left 11/8/2019    Procedure: CATHETERIZATION, HEART, LEFT;  Surgeon: Toby Morrow MD;  Location: [facility] CATH LAB;  Service: Cardiology;  Laterality: Left;    TUBAL LIGATION       Social History     Tobacco Use    Smoking status: Former Smoker     Packs/day: 1.00     Years: 20.00     Pack years: 20.00     Types: Cigarettes     Quit  "date: 2019     Years since quittin.6    Smokeless tobacco: Never Used   Substance Use Topics    Alcohol use: Yes     Frequency: Never     Drinks per session: 3 or 4     Binge frequency: Never     Comment: occasional    Drug use: No       /70   Pulse (!) 58   Temp 98 °F (36.7 °C) (Temporal)   Ht 5' 4" (1.626 m)   Wt 78.1 kg (172 lb 2.9 oz)   LMP  (LMP Unknown)   BMI 29.55 kg/m²     Review of Systems   Constitutional: Positive for unexpected weight change. Negative for activity change.   HENT: Negative for hearing loss, rhinorrhea and trouble swallowing.    Eyes: Positive for visual disturbance. Negative for discharge.   Respiratory: Positive for chest tightness. Negative for wheezing.    Cardiovascular: Positive for chest pain. Negative for palpitations.   Gastrointestinal: Negative for blood in stool, constipation, diarrhea and vomiting.   Endocrine: Negative for polydipsia and polyuria.   Genitourinary: Positive for difficulty urinating and dysuria. Negative for hematuria and menstrual problem.   Musculoskeletal: Positive for arthralgias and joint swelling. Negative for neck pain.   Neurological: Positive for headaches. Negative for weakness.   Psychiatric/Behavioral: Positive for confusion and dysphoric mood.       Objective:     Physical Exam  Vitals signs and nursing note reviewed.   Constitutional:       General: She is not in acute distress.     Appearance: She is well-developed.   HENT:      Right Ear: External ear normal.      Left Ear: External ear normal.   Eyes:      Conjunctiva/sclera: Conjunctivae normal.      Pupils: Pupils are equal, round, and reactive to light.   Neck:      Musculoskeletal: Normal range of motion and neck supple.      Thyroid: No thyromegaly.      Vascular: No carotid bruit.   Cardiovascular:      Rate and Rhythm: Normal rate and regular rhythm.      Heart sounds: Normal heart sounds. No murmur. No gallop.    Pulmonary:      Effort: Pulmonary effort is " normal. No respiratory distress.      Breath sounds: Normal breath sounds. No wheezing or rales.   Abdominal:      General: Bowel sounds are normal. There is no distension.      Palpations: Abdomen is soft.      Tenderness: There is no abdominal tenderness. There is no guarding.   Musculoskeletal:      Right lower leg: No edema.      Left lower leg: No edema.   Skin:     General: Skin is warm and dry.      Findings: No rash.   Neurological:      Mental Status: She is alert and oriented to person, place, and time.      Cranial Nerves: No cranial nerve deficit.   Psychiatric:         Behavior: Behavior normal.         Thought Content: Thought content normal.         Judgment: Judgment normal.         Lab Results   Component Value Date    WBC 9.08 12/03/2019    HGB 12.9 12/03/2019    HCT 39.4 12/03/2019     12/03/2019    CHOL 203 (H) 11/12/2018    TRIG 109 11/12/2018    HDL 46 11/12/2018    ALT 12 12/03/2019    AST 16 12/03/2019     12/03/2019    K 4.1 12/03/2019     12/03/2019    CREATININE 0.9 12/03/2019    BUN 9 12/03/2019    CO2 24 12/03/2019    TSH 1.406 11/12/2018    INR 1.0 10/24/2019    HGBA1C 4.9 11/12/2018       Assessment:     1. Annual physical exam    2. Breast cancer screening    3. S/P CABG x 2         Plan:     Annual physical exam  -     CBC auto differential; Future; Expected date: 07/29/2020  -     Comprehensive metabolic panel; Future; Expected date: 07/29/2020  -     Hepatitis C Antibody; Future; Expected date: 07/29/2020  -     Lipid Panel; Future; Expected date: 07/29/2020  -     Hemoglobin A1C; Future; Expected date: 07/29/2020  -     TSH; Future; Expected date: 07/29/2020  -     Vitamin D; Future; Expected date: 07/29/2020  -     URINALYSIS  -     Vitamin B12; Future; Expected date: 07/28/2020    Breast cancer screening  -     Mammo Digital Screening Bilat; Future; Expected date: 07/28/2020    S/P CABG x 2    Other orders  -     escitalopram oxalate (LEXAPRO) 20 MG tablet;  Take 1.5 tablets (30 mg total) by mouth every evening.  Dispense: 45 tablet; Refill: 6  -     gabapentin (NEURONTIN) 300 MG capsule; Take 1 capsule (300 mg total) by mouth every evening.  Dispense: 30 capsule; Refill: 3    Update labs tomorrow AM  UA today  Increase lexapro to 30mg and see how anxiety does.  Consider Celexa 40mg if no response.  Refill on neurontin 300mg nightly  BP controlled  Focus on better eating habits/more water intake  F/u likely 6 mos//PRN for meds

## 2020-07-29 ENCOUNTER — LAB VISIT (OUTPATIENT)
Dept: LAB | Facility: HOSPITAL | Age: 50
End: 2020-07-29
Attending: FAMILY MEDICINE
Payer: COMMERCIAL

## 2020-07-29 DIAGNOSIS — Z00.00 ANNUAL PHYSICAL EXAM: ICD-10-CM

## 2020-07-29 PROCEDURE — 82306 VITAMIN D 25 HYDROXY: CPT

## 2020-07-29 PROCEDURE — 83036 HEMOGLOBIN GLYCOSYLATED A1C: CPT

## 2020-07-29 PROCEDURE — 80061 LIPID PANEL: CPT

## 2020-07-29 PROCEDURE — 86803 HEPATITIS C AB TEST: CPT

## 2020-07-29 PROCEDURE — 85025 COMPLETE CBC W/AUTO DIFF WBC: CPT

## 2020-07-29 PROCEDURE — 84443 ASSAY THYROID STIM HORMONE: CPT

## 2020-07-29 PROCEDURE — 82607 VITAMIN B-12: CPT

## 2020-07-29 PROCEDURE — 36415 COLL VENOUS BLD VENIPUNCTURE: CPT | Mod: PO

## 2020-07-29 PROCEDURE — 80053 COMPREHEN METABOLIC PANEL: CPT

## 2020-07-30 LAB
25(OH)D3+25(OH)D2 SERPL-MCNC: 23 NG/ML (ref 30–96)
ALBUMIN SERPL BCP-MCNC: 3.6 G/DL (ref 3.5–5.2)
ALP SERPL-CCNC: 55 U/L (ref 55–135)
ALT SERPL W/O P-5'-P-CCNC: 30 U/L (ref 10–44)
ANION GAP SERPL CALC-SCNC: 9 MMOL/L (ref 8–16)
AST SERPL-CCNC: 29 U/L (ref 10–40)
BASOPHILS # BLD AUTO: 0.07 K/UL (ref 0–0.2)
BASOPHILS NFR BLD: 1 % (ref 0–1.9)
BILIRUB SERPL-MCNC: 0.4 MG/DL (ref 0.1–1)
BUN SERPL-MCNC: 9 MG/DL (ref 6–20)
CALCIUM SERPL-MCNC: 8.9 MG/DL (ref 8.7–10.5)
CHLORIDE SERPL-SCNC: 108 MMOL/L (ref 95–110)
CHOLEST SERPL-MCNC: 116 MG/DL (ref 120–199)
CHOLEST/HDLC SERPL: 2.8 {RATIO} (ref 2–5)
CO2 SERPL-SCNC: 26 MMOL/L (ref 23–29)
CREAT SERPL-MCNC: 0.9 MG/DL (ref 0.5–1.4)
DIFFERENTIAL METHOD: ABNORMAL
EOSINOPHIL # BLD AUTO: 0.1 K/UL (ref 0–0.5)
EOSINOPHIL NFR BLD: 1.4 % (ref 0–8)
ERYTHROCYTE [DISTWIDTH] IN BLOOD BY AUTOMATED COUNT: 13.5 % (ref 11.5–14.5)
EST. GFR  (AFRICAN AMERICAN): >60 ML/MIN/1.73 M^2
EST. GFR  (NON AFRICAN AMERICAN): >60 ML/MIN/1.73 M^2
ESTIMATED AVG GLUCOSE: 88 MG/DL (ref 68–131)
GLUCOSE SERPL-MCNC: 74 MG/DL (ref 70–110)
HBA1C MFR BLD HPLC: 4.7 % (ref 4–5.6)
HCT VFR BLD AUTO: 42.5 % (ref 37–48.5)
HCV AB SERPL QL IA: NEGATIVE
HDLC SERPL-MCNC: 42 MG/DL (ref 40–75)
HDLC SERPL: 36.2 % (ref 20–50)
HGB BLD-MCNC: 12.7 G/DL (ref 12–16)
IMM GRANULOCYTES # BLD AUTO: 0.02 K/UL (ref 0–0.04)
IMM GRANULOCYTES NFR BLD AUTO: 0.3 % (ref 0–0.5)
LDLC SERPL CALC-MCNC: 55.4 MG/DL (ref 63–159)
LYMPHOCYTES # BLD AUTO: 1.7 K/UL (ref 1–4.8)
LYMPHOCYTES NFR BLD: 24.9 % (ref 18–48)
MCH RBC QN AUTO: 30.3 PG (ref 27–31)
MCHC RBC AUTO-ENTMCNC: 29.9 G/DL (ref 32–36)
MCV RBC AUTO: 101 FL (ref 82–98)
MONOCYTES # BLD AUTO: 0.6 K/UL (ref 0.3–1)
MONOCYTES NFR BLD: 8.9 % (ref 4–15)
NEUTROPHILS # BLD AUTO: 4.4 K/UL (ref 1.8–7.7)
NEUTROPHILS NFR BLD: 63.5 % (ref 38–73)
NONHDLC SERPL-MCNC: 74 MG/DL
NRBC BLD-RTO: 0 /100 WBC
PLATELET # BLD AUTO: 214 K/UL (ref 150–350)
PMV BLD AUTO: 12 FL (ref 9.2–12.9)
POTASSIUM SERPL-SCNC: 3.8 MMOL/L (ref 3.5–5.1)
PROT SERPL-MCNC: 6.2 G/DL (ref 6–8.4)
RBC # BLD AUTO: 4.19 M/UL (ref 4–5.4)
SODIUM SERPL-SCNC: 143 MMOL/L (ref 136–145)
TRIGL SERPL-MCNC: 93 MG/DL (ref 30–150)
TSH SERPL DL<=0.005 MIU/L-ACNC: 0.69 UIU/ML (ref 0.4–4)
VIT B12 SERPL-MCNC: >2000 PG/ML (ref 210–950)
WBC # BLD AUTO: 6.98 K/UL (ref 3.9–12.7)

## 2020-07-31 ENCOUNTER — PATIENT MESSAGE (OUTPATIENT)
Dept: INTERNAL MEDICINE | Facility: CLINIC | Age: 50
End: 2020-07-31

## 2020-08-11 ENCOUNTER — OFFICE VISIT (OUTPATIENT)
Dept: INTERNAL MEDICINE | Facility: CLINIC | Age: 50
End: 2020-08-11
Payer: COMMERCIAL

## 2020-08-11 ENCOUNTER — TELEPHONE (OUTPATIENT)
Dept: INTERNAL MEDICINE | Facility: CLINIC | Age: 50
End: 2020-08-11

## 2020-08-11 DIAGNOSIS — U07.1 COVID-19 VIRUS INFECTION: Primary | ICD-10-CM

## 2020-08-11 DIAGNOSIS — R07.89 CHEST TIGHTNESS: ICD-10-CM

## 2020-08-11 DIAGNOSIS — R06.02 SOB (SHORTNESS OF BREATH): ICD-10-CM

## 2020-08-11 PROCEDURE — 99214 OFFICE O/P EST MOD 30 MIN: CPT | Mod: 95,,, | Performed by: FAMILY MEDICINE

## 2020-08-11 PROCEDURE — 99214 PR OFFICE/OUTPT VISIT, EST, LEVL IV, 30-39 MIN: ICD-10-PCS | Mod: 95,,, | Performed by: FAMILY MEDICINE

## 2020-08-11 NOTE — TELEPHONE ENCOUNTER
Put an order in for [facility] with [facility]//pls be sure to get that faxed over today//need to see if they can see her tomorrow

## 2020-08-11 NOTE — TELEPHONE ENCOUNTER
Called pt and she tested + COVID at [facility].  She is having pain and now thinks she has UTI.  Booked her a video visit today at 2:40 pm with Dr. Lei

## 2020-08-11 NOTE — TELEPHONE ENCOUNTER
----- Message from Margaret Carreon sent at 8/11/2020 10:18 AM CDT -----  Regarding: tested positive  Contact: Pt  Pt is calling the staff regarding the pt tested positive for Covid .    Pt stated that the pt back and ears have been hurting and pt has a bladder infection.     Pt has been taking [place] pills for the infection    Pt is requesting a call back to discuss further [phone]    thanks

## 2020-08-11 NOTE — PROGRESS NOTES
Subjective:      Patient ID: Eunice Talavera is a 50 y.o. female.    Chief Complaint:  Pos Covid, possible UTI    HPI  51 yo female presents via telemed.  She started feeling bad this weekend and went to  on Sunday.  Was swabbed and tested + for Covid.  She was given the following meds:  Augmentin  Zpak  Allegra D  Astelyn and flonase  Dexamethasone  Trilogy  Promethazine syrup  Ventolin inhaler  Diflucan  She has had some yeast issues with some discomfort on urinating.  Wasn't sure if urine or yeast.  Lower back is hurting some.  Trying to drink more water.  Has not had elevated temps.  Is having occ cough with some chest tightness and SOB.  SOB can occur with talking long periods.  She had CXR at  and it was reportedly normal.   returns from work tomorrow  She does not have a pulse ox  The patient location is: Home  The chief complaint leading to consultation is: Covid/UTI    Visit type: audiovisual    Face to Face time with patient: 15 mins   minutes of total time spent on the encounter, which includes face to face time and non-face to face time preparing to see the patient (eg, review of tests), Obtaining and/or reviewing separately obtained history, Documenting clinical information in the electronic or other health record, Independently interpreting results (not separately reported) and communicating results to the patient/family/caregiver, or Care coordination (not separately reported).         Each patient to whom he or she provides medical services by telemedicine is:  (1) informed of the relationship between the physician and patient and the respective role of any other health care provider with respect to management of the patient; and (2) notified that he or she may decline to receive medical services by telemedicine and may withdraw from such care at any time.    Notes:     Past Medical History:   Diagnosis Date    Anxiety     CAD (coronary artery disease)     Depression     GERD  (gastroesophageal reflux disease)     Hyperlipidemia      Family History   Problem Relation Age of Onset    Heart disease Father     Breast cancer Maternal Grandmother     Diabetes Paternal Grandmother     Heart disease Paternal Grandfather     Breast cancer Maternal Aunt     Breast cancer Maternal Cousin     Colon cancer Neg Hx      Past Surgical History:   Procedure Laterality Date    BARIATRIC SURGERY      Gastric bypass    breast augmentation      BREAST SURGERY Bilateral     10/2016    CORONARY ARTERY BYPASS GRAFT      HYSTERECTOMY      Left Elbow      LEFT HEART CATHETERIZATION Left 2019    Procedure: CATHETERIZATION, HEART, LEFT;  Surgeon: Toby Morrow MD;  Location: [facility] CATH LAB;  Service: Cardiology;  Laterality: Left;  pt informed 11am start time    LEFT HEART CATHETERIZATION Left 2019    Procedure: CATHETERIZATION, HEART, LEFT;  Surgeon: Toby Morrow MD;  Location: [facility] CATH LAB;  Service: Cardiology;  Laterality: Left;    TUBAL LIGATION       Social History     Tobacco Use    Smoking status: Former Smoker     Packs/day: 1.00     Years: 20.00     Pack years: 20.00     Types: Cigarettes     Quit date: 2019     Years since quittin.7    Smokeless tobacco: Never Used   Substance Use Topics    Alcohol use: Yes     Frequency: Never     Drinks per session: 3 or 4     Binge frequency: Never     Comment: occasional    Drug use: No       LMP  (LMP Unknown)     Review of Systems   Constitutional: Positive for fatigue. Negative for chills.   HENT: Positive for congestion, ear pain, sinus pressure and sinus pain.    Respiratory: Positive for cough, chest tightness and shortness of breath.    Gastrointestinal: Positive for nausea. Negative for constipation and vomiting.   Genitourinary: Positive for dysuria, flank pain, frequency and urgency. Negative for hematuria.   Skin: Negative for rash.       Objective:     Physical Exam  Constitutional:       General: She  is not in acute distress.     Appearance: She is well-developed. She is not diaphoretic.   HENT:      Head: Normocephalic.   Eyes:      Conjunctiva/sclera: Conjunctivae normal.   Neck:      Musculoskeletal: Neck supple.   Pulmonary:      Effort: Pulmonary effort is normal. No tachypnea, accessory muscle usage or respiratory distress.      Comments: Pt with some mild dyspnea while talking to me.  No audible wheezing  No coughing during visit  Skin:     Coloration: Skin is not pale.   Neurological:      Mental Status: She is alert and oriented to person, place, and time.   Psychiatric:         Mood and Affect: Mood normal.         Behavior: Behavior normal.         Thought Content: Thought content normal.         Judgment: Judgment normal.         Lab Results   Component Value Date    WBC 6.98 07/29/2020    HGB 12.7 07/29/2020    HCT 42.5 07/29/2020     07/29/2020    CHOL 116 (L) 07/29/2020    TRIG 93 07/29/2020    HDL 42 07/29/2020    ALT 30 07/29/2020    AST 29 07/29/2020     07/29/2020    K 3.8 07/29/2020     07/29/2020    CREATININE 0.9 07/29/2020    BUN 9 07/29/2020    CO2 26 07/29/2020    TSH 0.685 07/29/2020    INR 1.0 10/24/2019    HGBA1C 4.7 07/29/2020       Assessment:     1. COVID-19 virus infection    2. Chest tightness    3. SOB (shortness of breath)         Plan:     COVID-19 virus infection  -     Ambulatory referral/consult to Home Health; Future; Expected date: 08/18/2020    Chest tightness  -     Ambulatory referral/consult to Home Health; Future; Expected date: 08/18/2020    SOB (shortness of breath)  -     Ambulatory referral/consult to Home Health; Future; Expected date: 08/18/2020    Will trigger HH for a visit//pt needs to have resp status/pulse ox done.  We can have them collect UA for analysis and culture  If SOB worsens, go to ER.  Stop Allegra D  Explained that all the meds not likely to help the Covid//except for inhaler/ventolin.  Use diflucan, just took dose yesterday.   Likely cause for the urinary symptoms.  No further Abx until tested  F/u PRN

## 2020-08-12 PROCEDURE — G0180 PR HOME HEALTH MD CERTIFICATION: ICD-10-PCS | Mod: ,,, | Performed by: FAMILY MEDICINE

## 2020-08-12 PROCEDURE — G0180 MD CERTIFICATION HHA PATIENT: HCPCS | Mod: ,,, | Performed by: FAMILY MEDICINE

## 2020-08-14 ENCOUNTER — TELEPHONE (OUTPATIENT)
Dept: INTERNAL MEDICINE | Facility: CLINIC | Age: 50
End: 2020-08-14

## 2020-08-14 NOTE — TELEPHONE ENCOUNTER
Called Shaniqua and let her know that Dr. Lei is not here and if she is having difficulty breathing when lying down and PO is 92-93 she will need to go to ER for evaluation.

## 2020-08-14 NOTE — TELEPHONE ENCOUNTER
----- Message from Rosa Hooper sent at 8/14/2020  4:40 PM CDT -----  [facility] Nurse is calling regarding pt testing positive COVID pt having trouble breathing oxygen level is 92-93 when she lay down. Please call back at [phone]

## 2020-08-18 ENCOUNTER — TELEPHONE (OUTPATIENT)
Dept: INTERNAL MEDICINE | Facility: CLINIC | Age: 50
End: 2020-08-18

## 2020-08-18 NOTE — TELEPHONE ENCOUNTER
----- Message from Eakterina Gonzales sent at 8/18/2020  1:30 PM CDT -----  Regarding: call back request  Shaniqua w/ [facility] request call back regarding pt covid positive complaining of episodes pt is trying to push fluids and stay hydrated ... call back  [phone]

## 2020-08-18 NOTE — TELEPHONE ENCOUNTER
Called Shaniqua and she said that pt is COVID + and she called today and said she is weak and she did her BP while Shaniqua on phone and she is a little orthostatic.  She was instructed to hydrate.  She sounds better that last week.  She will be going out to see her tomorrow.  This is just FYI

## 2020-08-20 ENCOUNTER — TELEPHONE (OUTPATIENT)
Dept: INTERNAL MEDICINE | Facility: CLINIC | Age: 50
End: 2020-08-20

## 2020-08-20 NOTE — TELEPHONE ENCOUNTER
----- Message from Jane Soria sent at 8/20/2020 12:29 PM CDT -----  Contact: RUDI  Would like to consult with nurse she tested positive for covid and she is feeling bad please call back [phone]

## 2020-08-20 NOTE — TELEPHONE ENCOUNTER
Patient stated that she was tested positive for covid.  She is on blood pressure medications.  She is having some problems with her blood pressure dropping too low.  Sometimes as low as 80/40s.  She is not moving around much and is not eating much.  She is scheduled for video visit with you tomorrow to discuss, she just wants to know for now, should she come off of some of her bp medications.

## 2020-08-21 ENCOUNTER — OFFICE VISIT (OUTPATIENT)
Dept: INTERNAL MEDICINE | Facility: CLINIC | Age: 50
End: 2020-08-21
Payer: COMMERCIAL

## 2020-08-21 ENCOUNTER — TELEPHONE (OUTPATIENT)
Dept: INTERNAL MEDICINE | Facility: CLINIC | Age: 50
End: 2020-08-21

## 2020-08-21 DIAGNOSIS — R53.1 WEAKNESS: ICD-10-CM

## 2020-08-21 DIAGNOSIS — I95.9 HYPOTENSION, UNSPECIFIED HYPOTENSION TYPE: ICD-10-CM

## 2020-08-21 DIAGNOSIS — U07.1 COVID-19 VIRUS INFECTION: Primary | ICD-10-CM

## 2020-08-21 PROCEDURE — 99213 OFFICE O/P EST LOW 20 MIN: CPT | Mod: 95,,, | Performed by: FAMILY MEDICINE

## 2020-08-21 PROCEDURE — 99213 PR OFFICE/OUTPT VISIT, EST, LEVL III, 20-29 MIN: ICD-10-PCS | Mod: 95,,, | Performed by: FAMILY MEDICINE

## 2020-08-21 NOTE — TELEPHONE ENCOUNTER
----- Message from Michelle Soria sent at 8/21/2020  1:03 PM CDT -----  Contact: [facility] from [facility]  Is calling rg being in the pt's home and her BP is low and is calling to discuss before she takes her med and can be reached at [phone]/ no symptoms just want to discuss the meds //thanks/dbw

## 2020-08-21 NOTE — PROGRESS NOTES
Subjective:      Patient ID: Eunice Talavera is a 50 y.o. female.    Chief Complaint: ER f/u    HPI  49 yo with Covid about 12 days ago presents via telemed. She went to ER Tues night/Axel with hypotension/weakness.  Test for Covid still +  She was given IV fluids and felt better.  BP fluctuating/low.  Trying to stay hydrated.  Still feeling weak/fatigue.  SOB/resp symptoms are improving  She finished Abx/steroids that were given at .  She is using inhaler 3-4 times a day.  No fever  Asking about work  The patient location is: Home  The chief complaint leading to consultation is: Weakness/ER f/u    Visit type: audiovisual    Face to Face time with patient: 10 mins   minutes of total time spent on the encounter, which includes face to face time and non-face to face time preparing to see the patient (eg, review of tests), Obtaining and/or reviewing separately obtained history, Documenting clinical information in the electronic or other health record, Independently interpreting results (not separately reported) and communicating results to the patient/family/caregiver, or Care coordination (not separately reported).         Each patient to whom he or she provides medical services by telemedicine is:  (1) informed of the relationship between the physician and patient and the respective role of any other health care provider with respect to management of the patient; and (2) notified that he or she may decline to receive medical services by telemedicine and may withdraw from such care at any time.    Notes:     Past Medical History:   Diagnosis Date    Anxiety     CAD (coronary artery disease)     Depression     GERD (gastroesophageal reflux disease)     Hyperlipidemia      Family History   Problem Relation Age of Onset    Heart disease Father     Breast cancer Maternal Grandmother     Diabetes Paternal Grandmother     Heart disease Paternal Grandfather     Breast cancer Maternal Aunt     Breast cancer  Maternal Cousin     Colon cancer Neg Hx      Past Surgical History:   Procedure Laterality Date    BARIATRIC SURGERY      Gastric bypass    breast augmentation      BREAST SURGERY Bilateral     10/2016    CORONARY ARTERY BYPASS GRAFT      HYSTERECTOMY      Left Elbow      LEFT HEART CATHETERIZATION Left 2019    Procedure: CATHETERIZATION, HEART, LEFT;  Surgeon: Toby Morrow MD;  Location: [facility] CATH LAB;  Service: Cardiology;  Laterality: Left;  pt informed 11am start time    LEFT HEART CATHETERIZATION Left 2019    Procedure: CATHETERIZATION, HEART, LEFT;  Surgeon: Toby Morrow MD;  Location: [facility] CATH LAB;  Service: Cardiology;  Laterality: Left;    TUBAL LIGATION       Social History     Tobacco Use    Smoking status: Former Smoker     Packs/day: 1.00     Years: 20.00     Pack years: 20.00     Types: Cigarettes     Quit date: 2019     Years since quittin.7    Smokeless tobacco: Never Used   Substance Use Topics    Alcohol use: Yes     Frequency: Never     Drinks per session: 3 or 4     Binge frequency: Never     Comment: occasional    Drug use: No       LMP  (LMP Unknown)     Review of Systems   Constitutional: Positive for activity change and unexpected weight change.   HENT: Negative for hearing loss, rhinorrhea and trouble swallowing.    Eyes: Negative for discharge and visual disturbance.   Respiratory: Positive for chest tightness. Negative for wheezing.    Cardiovascular: Negative for chest pain and palpitations.   Gastrointestinal: Positive for constipation. Negative for blood in stool, diarrhea and vomiting.   Endocrine: Negative for polydipsia and polyuria.   Genitourinary: Negative for difficulty urinating, dysuria, hematuria and menstrual problem.   Musculoskeletal: Positive for neck pain. Negative for arthralgias and joint swelling.   Neurological: Positive for weakness and headaches.   Psychiatric/Behavioral: Negative for confusion and dysphoric mood.        Objective:     Physical Exam  Constitutional:       General: She is not in acute distress.     Appearance: She is well-developed. She is not diaphoretic.   HENT:      Head: Normocephalic.   Eyes:      Conjunctiva/sclera: Conjunctivae normal.   Neck:      Musculoskeletal: Neck supple.   Pulmonary:      Effort: Pulmonary effort is normal. No tachypnea, accessory muscle usage or respiratory distress.   Skin:     Coloration: Skin is not pale.   Neurological:      Mental Status: She is alert and oriented to person, place, and time.   Psychiatric:         Mood and Affect: Mood normal.         Behavior: Behavior normal.         Thought Content: Thought content normal.         Judgment: Judgment normal.         Lab Results   Component Value Date    WBC 6.98 07/29/2020    HGB 12.7 07/29/2020    HCT 42.5 07/29/2020     07/29/2020    CHOL 116 (L) 07/29/2020    TRIG 93 07/29/2020    HDL 42 07/29/2020    ALT 30 07/29/2020    AST 29 07/29/2020     07/29/2020    K 3.8 07/29/2020     07/29/2020    CREATININE 0.9 07/29/2020    BUN 9 07/29/2020    CO2 26 07/29/2020    TSH 0.685 07/29/2020    INR 1.0 10/24/2019    HGBA1C 4.7 07/29/2020       Assessment:     1. COVID-19 virus infection    2. Weakness    3. Hypotension, unspecified hypotension type         Plan:     COVID-19 virus infection    Weakness    Hypotension, unspecified hypotension type      BP this /70.    Work on hydration/continued rest  May return after 14 days to work if symptom free.  ADvised pt to check in Monday AM with how she is feeling  Will get note for work  F/u PRN

## 2020-08-21 NOTE — TELEPHONE ENCOUNTER
Shaniqua called back and said that pt was told to take 1/2 tab of her BP medication and she said that pt has not taken it yet and her BP is 98 systolic sitting and 96 systolic while standing.  She didn't know if she needed to hold the BP medication or not?

## 2020-08-21 NOTE — TELEPHONE ENCOUNTER
Called Shaniqua and let her know that Dr. Lei said that she is to hold the BP medication if < 110 systolic

## 2020-09-25 ENCOUNTER — PATIENT MESSAGE (OUTPATIENT)
Dept: INTERNAL MEDICINE | Facility: CLINIC | Age: 50
End: 2020-09-25

## 2020-09-25 RX ORDER — ESOMEPRAZOLE MAGNESIUM 40 MG/1
40 CAPSULE, DELAYED RELEASE ORAL DAILY PRN
Qty: 30 CAPSULE | Refills: 0 | Status: SHIPPED | OUTPATIENT
Start: 2020-09-25 | End: 2020-12-16 | Stop reason: SDUPTHER

## 2020-09-25 RX ORDER — ALPRAZOLAM 0.25 MG/1
0.25 TABLET ORAL 2 TIMES DAILY PRN
Qty: 20 TABLET | Refills: 0 | Status: SHIPPED | OUTPATIENT
Start: 2020-09-25 | End: 2020-12-16 | Stop reason: SDUPTHER

## 2020-09-25 RX ORDER — ONDANSETRON 8 MG/1
8 TABLET, ORALLY DISINTEGRATING ORAL EVERY 8 HOURS PRN
Qty: 20 TABLET | Refills: 0 | Status: SHIPPED | OUTPATIENT
Start: 2020-09-25

## 2020-09-28 ENCOUNTER — PATIENT MESSAGE (OUTPATIENT)
Dept: INTERNAL MEDICINE | Facility: CLINIC | Age: 50
End: 2020-09-28

## 2020-09-28 NOTE — TELEPHONE ENCOUNTER
Will fill chronic meds//if sent for request  Use Monistat 7 day course for yeast//unless she wants visit

## 2020-12-16 ENCOUNTER — PATIENT MESSAGE (OUTPATIENT)
Dept: INTERNAL MEDICINE | Facility: CLINIC | Age: 50
End: 2020-12-16

## 2020-12-16 DIAGNOSIS — R06.2 WHEEZING: ICD-10-CM

## 2020-12-16 DIAGNOSIS — R06.02 SOB (SHORTNESS OF BREATH): Primary | ICD-10-CM

## 2020-12-16 RX ORDER — ALBUTEROL SULFATE 90 UG/1
AEROSOL, METERED RESPIRATORY (INHALATION)
COMMUNITY
Start: 2020-12-09 | End: 2021-07-30 | Stop reason: SDUPTHER

## 2020-12-16 NOTE — TELEPHONE ENCOUNTER
lv 8/21/20, f/u 1/29/21.   Pt is also requesting a prescription for nebulizer machine and  albuterol nebulizer solution. She said, she had one previously, but it was old and no longer works.

## 2020-12-17 RX ORDER — ALPRAZOLAM 0.25 MG/1
0.25 TABLET ORAL 2 TIMES DAILY PRN
Qty: 20 TABLET | Refills: 0 | Status: SHIPPED | OUTPATIENT
Start: 2020-12-17 | End: 2021-07-30 | Stop reason: SDUPTHER

## 2020-12-17 RX ORDER — ESOMEPRAZOLE MAGNESIUM 40 MG/1
40 CAPSULE, DELAYED RELEASE ORAL DAILY PRN
Qty: 30 CAPSULE | Refills: 3 | Status: SHIPPED | OUTPATIENT
Start: 2020-12-17 | End: 2021-07-30 | Stop reason: SDUPTHER

## 2020-12-18 RX ORDER — ALBUTEROL SULFATE 0.83 MG/ML
2.5 SOLUTION RESPIRATORY (INHALATION) EVERY 6 HOURS PRN
Qty: 3 BOX | Refills: 0 | Status: SHIPPED | OUTPATIENT
Start: 2020-12-18 | End: 2021-07-30 | Stop reason: SDUPTHER

## 2020-12-18 NOTE — TELEPHONE ENCOUNTER
I faxed the prescription for nebulizer to [facility]. Pt will need a prescription for the solution sent to the pharmacy.

## 2021-01-04 ENCOUNTER — EXTERNAL HOME HEALTH (OUTPATIENT)
Dept: HOME HEALTH SERVICES | Facility: HOSPITAL | Age: 51
End: 2021-01-04
Payer: COMMERCIAL

## 2021-01-22 ENCOUNTER — NURSE TRIAGE (OUTPATIENT)
Dept: ADMINISTRATIVE | Facility: CLINIC | Age: 51
End: 2021-01-22

## 2021-01-29 ENCOUNTER — OFFICE VISIT (OUTPATIENT)
Dept: INTERNAL MEDICINE | Facility: CLINIC | Age: 51
End: 2021-01-29
Payer: COMMERCIAL

## 2021-01-29 VITALS
TEMPERATURE: 98 F | HEIGHT: 64 IN | SYSTOLIC BLOOD PRESSURE: 110 MMHG | BODY MASS INDEX: 32.29 KG/M2 | WEIGHT: 189.13 LBS | DIASTOLIC BLOOD PRESSURE: 72 MMHG | HEART RATE: 70 BPM

## 2021-01-29 DIAGNOSIS — Z95.820 S/P ANGIOPLASTY WITH STENT: ICD-10-CM

## 2021-01-29 DIAGNOSIS — Z95.1 S/P CABG X 2: Primary | ICD-10-CM

## 2021-01-29 DIAGNOSIS — M25.512 LEFT SHOULDER PAIN, UNSPECIFIED CHRONICITY: ICD-10-CM

## 2021-01-29 DIAGNOSIS — Z00.00 ANNUAL PHYSICAL EXAM: ICD-10-CM

## 2021-01-29 PROCEDURE — 3008F PR BODY MASS INDEX (BMI) DOCUMENTED: ICD-10-PCS | Mod: CPTII,S$GLB,, | Performed by: FAMILY MEDICINE

## 2021-01-29 PROCEDURE — 1125F AMNT PAIN NOTED PAIN PRSNT: CPT | Mod: S$GLB,,, | Performed by: FAMILY MEDICINE

## 2021-01-29 PROCEDURE — 99999 PR PBB SHADOW E&M-EST. PATIENT-LVL III: ICD-10-PCS | Mod: PBBFAC,,, | Performed by: FAMILY MEDICINE

## 2021-01-29 PROCEDURE — 99999 PR PBB SHADOW E&M-EST. PATIENT-LVL III: CPT | Mod: PBBFAC,,, | Performed by: FAMILY MEDICINE

## 2021-01-29 PROCEDURE — 3008F BODY MASS INDEX DOCD: CPT | Mod: CPTII,S$GLB,, | Performed by: FAMILY MEDICINE

## 2021-01-29 PROCEDURE — 1125F PR PAIN SEVERITY QUANTIFIED, PAIN PRESENT: ICD-10-PCS | Mod: S$GLB,,, | Performed by: FAMILY MEDICINE

## 2021-01-29 PROCEDURE — 99214 OFFICE O/P EST MOD 30 MIN: CPT | Mod: S$GLB,,, | Performed by: FAMILY MEDICINE

## 2021-01-29 PROCEDURE — 99214 PR OFFICE/OUTPT VISIT, EST, LEVL IV, 30-39 MIN: ICD-10-PCS | Mod: S$GLB,,, | Performed by: FAMILY MEDICINE

## 2021-01-29 RX ORDER — RANOLAZINE 1000 MG/1
1000 TABLET, EXTENDED RELEASE ORAL 2 TIMES DAILY
COMMUNITY
End: 2021-07-30

## 2021-01-29 RX ORDER — EZETIMIBE 10 MG/1
10 TABLET ORAL DAILY
COMMUNITY

## 2021-01-29 RX ORDER — NITROGLYCERIN 0.4 MG/1
TABLET SUBLINGUAL
COMMUNITY
Start: 2020-11-19

## 2021-01-29 RX ORDER — NITROGLYCERIN 0.3 MG/1
0.3 TABLET SUBLINGUAL EVERY 5 MIN PRN
COMMUNITY
End: 2021-01-29 | Stop reason: DRUGHIGH

## 2021-01-29 RX ORDER — ASPIRIN 81 MG/1
81 TABLET ORAL DAILY
COMMUNITY

## 2021-02-09 ENCOUNTER — TELEPHONE (OUTPATIENT)
Dept: INTERNAL MEDICINE | Facility: CLINIC | Age: 51
End: 2021-02-09

## 2021-02-11 ENCOUNTER — TELEPHONE (OUTPATIENT)
Dept: INTERNAL MEDICINE | Facility: CLINIC | Age: 51
End: 2021-02-11

## 2021-02-18 ENCOUNTER — TELEPHONE (OUTPATIENT)
Dept: INTERNAL MEDICINE | Facility: CLINIC | Age: 51
End: 2021-02-18

## 2021-02-23 ENCOUNTER — TELEPHONE (OUTPATIENT)
Dept: INTERNAL MEDICINE | Facility: CLINIC | Age: 51
End: 2021-02-23

## 2021-02-25 ENCOUNTER — TELEPHONE (OUTPATIENT)
Dept: INTERNAL MEDICINE | Facility: CLINIC | Age: 51
End: 2021-02-25

## 2021-03-12 ENCOUNTER — PATIENT MESSAGE (OUTPATIENT)
Dept: INTERNAL MEDICINE | Facility: CLINIC | Age: 51
End: 2021-03-12

## 2021-03-12 ENCOUNTER — TELEPHONE (OUTPATIENT)
Dept: INTERNAL MEDICINE | Facility: CLINIC | Age: 51
End: 2021-03-12

## 2021-03-16 ENCOUNTER — TELEPHONE (OUTPATIENT)
Dept: INTERNAL MEDICINE | Facility: CLINIC | Age: 51
End: 2021-03-16

## 2021-03-17 ENCOUNTER — TELEPHONE (OUTPATIENT)
Dept: INTERNAL MEDICINE | Facility: CLINIC | Age: 51
End: 2021-03-17

## 2021-03-26 ENCOUNTER — TELEPHONE (OUTPATIENT)
Dept: INTERNAL MEDICINE | Facility: CLINIC | Age: 51
End: 2021-03-26

## 2021-07-20 ENCOUNTER — LAB VISIT (OUTPATIENT)
Dept: LAB | Facility: HOSPITAL | Age: 51
End: 2021-07-20
Payer: COMMERCIAL

## 2021-07-20 DIAGNOSIS — Z00.00 ANNUAL PHYSICAL EXAM: ICD-10-CM

## 2021-07-20 LAB
25(OH)D3+25(OH)D2 SERPL-MCNC: 22 NG/ML (ref 30–96)
ALBUMIN SERPL BCP-MCNC: 3.4 G/DL (ref 3.5–5.2)
ALP SERPL-CCNC: 49 U/L (ref 55–135)
ALT SERPL W/O P-5'-P-CCNC: 26 U/L (ref 10–44)
ANION GAP SERPL CALC-SCNC: 6 MMOL/L (ref 8–16)
AST SERPL-CCNC: 21 U/L (ref 10–40)
BASOPHILS # BLD AUTO: 0.07 K/UL (ref 0–0.2)
BASOPHILS NFR BLD: 0.6 % (ref 0–1.9)
BILIRUB SERPL-MCNC: 0.4 MG/DL (ref 0.1–1)
BUN SERPL-MCNC: 14 MG/DL (ref 6–20)
CALCIUM SERPL-MCNC: 9.5 MG/DL (ref 8.7–10.5)
CHLORIDE SERPL-SCNC: 108 MMOL/L (ref 95–110)
CHOLEST SERPL-MCNC: 166 MG/DL (ref 120–199)
CHOLEST/HDLC SERPL: 3 {RATIO} (ref 2–5)
CO2 SERPL-SCNC: 27 MMOL/L (ref 23–29)
CREAT SERPL-MCNC: 0.9 MG/DL (ref 0.5–1.4)
DIFFERENTIAL METHOD: ABNORMAL
EOSINOPHIL # BLD AUTO: 0.2 K/UL (ref 0–0.5)
EOSINOPHIL NFR BLD: 1.2 % (ref 0–8)
ERYTHROCYTE [DISTWIDTH] IN BLOOD BY AUTOMATED COUNT: 13.5 % (ref 11.5–14.5)
EST. GFR  (AFRICAN AMERICAN): >60 ML/MIN/1.73 M^2
EST. GFR  (NON AFRICAN AMERICAN): >60 ML/MIN/1.73 M^2
ESTIMATED AVG GLUCOSE: 94 MG/DL (ref 68–131)
GLUCOSE SERPL-MCNC: 87 MG/DL (ref 70–110)
HBA1C MFR BLD: 4.9 % (ref 4–5.6)
HCT VFR BLD AUTO: 42.5 % (ref 37–48.5)
HDLC SERPL-MCNC: 55 MG/DL (ref 40–75)
HDLC SERPL: 33.1 % (ref 20–50)
HGB BLD-MCNC: 13.3 G/DL (ref 12–16)
IMM GRANULOCYTES # BLD AUTO: 0.16 K/UL (ref 0–0.04)
IMM GRANULOCYTES NFR BLD AUTO: 1.3 % (ref 0–0.5)
LDLC SERPL CALC-MCNC: 88.8 MG/DL (ref 63–159)
LYMPHOCYTES # BLD AUTO: 2.6 K/UL (ref 1–4.8)
LYMPHOCYTES NFR BLD: 21.5 % (ref 18–48)
MCH RBC QN AUTO: 29.4 PG (ref 27–31)
MCHC RBC AUTO-ENTMCNC: 31.3 G/DL (ref 32–36)
MCV RBC AUTO: 94 FL (ref 82–98)
MONOCYTES # BLD AUTO: 0.9 K/UL (ref 0.3–1)
MONOCYTES NFR BLD: 7.5 % (ref 4–15)
NEUTROPHILS # BLD AUTO: 8.2 K/UL (ref 1.8–7.7)
NEUTROPHILS NFR BLD: 67.9 % (ref 38–73)
NONHDLC SERPL-MCNC: 111 MG/DL
NRBC BLD-RTO: 0 /100 WBC
PLATELET # BLD AUTO: 349 K/UL (ref 150–450)
PMV BLD AUTO: 10.2 FL (ref 9.2–12.9)
POTASSIUM SERPL-SCNC: 5.7 MMOL/L (ref 3.5–5.1)
PROT SERPL-MCNC: 6.2 G/DL (ref 6–8.4)
RBC # BLD AUTO: 4.52 M/UL (ref 4–5.4)
SODIUM SERPL-SCNC: 141 MMOL/L (ref 136–145)
TRIGL SERPL-MCNC: 111 MG/DL (ref 30–150)
TSH SERPL DL<=0.005 MIU/L-ACNC: 1.9 UIU/ML (ref 0.4–4)
WBC # BLD AUTO: 12.06 K/UL (ref 3.9–12.7)

## 2021-07-20 PROCEDURE — 84443 ASSAY THYROID STIM HORMONE: CPT | Performed by: FAMILY MEDICINE

## 2021-07-20 PROCEDURE — 82306 VITAMIN D 25 HYDROXY: CPT | Performed by: FAMILY MEDICINE

## 2021-07-20 PROCEDURE — 80061 LIPID PANEL: CPT | Performed by: FAMILY MEDICINE

## 2021-07-20 PROCEDURE — 80053 COMPREHEN METABOLIC PANEL: CPT | Performed by: FAMILY MEDICINE

## 2021-07-20 PROCEDURE — 36415 COLL VENOUS BLD VENIPUNCTURE: CPT | Mod: PO | Performed by: FAMILY MEDICINE

## 2021-07-20 PROCEDURE — 83036 HEMOGLOBIN GLYCOSYLATED A1C: CPT | Performed by: FAMILY MEDICINE

## 2021-07-20 PROCEDURE — 85025 COMPLETE CBC W/AUTO DIFF WBC: CPT | Performed by: FAMILY MEDICINE

## 2021-07-30 ENCOUNTER — OFFICE VISIT (OUTPATIENT)
Dept: INTERNAL MEDICINE | Facility: CLINIC | Age: 51
End: 2021-07-30
Payer: COMMERCIAL

## 2021-07-30 VITALS
WEIGHT: 193.31 LBS | BODY MASS INDEX: 32.21 KG/M2 | RESPIRATION RATE: 18 BRPM | DIASTOLIC BLOOD PRESSURE: 62 MMHG | HEIGHT: 65 IN | SYSTOLIC BLOOD PRESSURE: 118 MMHG | TEMPERATURE: 99 F | HEART RATE: 82 BPM

## 2021-07-30 DIAGNOSIS — Z00.00 ANNUAL PHYSICAL EXAM: Primary | ICD-10-CM

## 2021-07-30 DIAGNOSIS — Z12.31 ENCOUNTER FOR SCREENING MAMMOGRAM FOR MALIGNANT NEOPLASM OF BREAST: ICD-10-CM

## 2021-07-30 PROCEDURE — 99999 PR PBB SHADOW E&M-EST. PATIENT-LVL IV: CPT | Mod: PBBFAC,,, | Performed by: FAMILY MEDICINE

## 2021-07-30 PROCEDURE — 1159F MED LIST DOCD IN RCRD: CPT | Mod: CPTII,S$GLB,, | Performed by: FAMILY MEDICINE

## 2021-07-30 PROCEDURE — 3078F PR MOST RECENT DIASTOLIC BLOOD PRESSURE < 80 MM HG: ICD-10-PCS | Mod: CPTII,S$GLB,, | Performed by: FAMILY MEDICINE

## 2021-07-30 PROCEDURE — 99999 PR PBB SHADOW E&M-EST. PATIENT-LVL IV: ICD-10-PCS | Mod: PBBFAC,,, | Performed by: FAMILY MEDICINE

## 2021-07-30 PROCEDURE — 1160F PR REVIEW ALL MEDS BY PRESCRIBER/CLIN PHARMACIST DOCUMENTED: ICD-10-PCS | Mod: CPTII,S$GLB,, | Performed by: FAMILY MEDICINE

## 2021-07-30 PROCEDURE — 3044F PR MOST RECENT HEMOGLOBIN A1C LEVEL <7.0%: ICD-10-PCS | Mod: CPTII,S$GLB,, | Performed by: FAMILY MEDICINE

## 2021-07-30 PROCEDURE — 3044F HG A1C LEVEL LT 7.0%: CPT | Mod: CPTII,S$GLB,, | Performed by: FAMILY MEDICINE

## 2021-07-30 PROCEDURE — 3078F DIAST BP <80 MM HG: CPT | Mod: CPTII,S$GLB,, | Performed by: FAMILY MEDICINE

## 2021-07-30 PROCEDURE — 1125F AMNT PAIN NOTED PAIN PRSNT: CPT | Mod: CPTII,S$GLB,, | Performed by: FAMILY MEDICINE

## 2021-07-30 PROCEDURE — 3074F PR MOST RECENT SYSTOLIC BLOOD PRESSURE < 130 MM HG: ICD-10-PCS | Mod: CPTII,S$GLB,, | Performed by: FAMILY MEDICINE

## 2021-07-30 PROCEDURE — 1160F RVW MEDS BY RX/DR IN RCRD: CPT | Mod: CPTII,S$GLB,, | Performed by: FAMILY MEDICINE

## 2021-07-30 PROCEDURE — 3074F SYST BP LT 130 MM HG: CPT | Mod: CPTII,S$GLB,, | Performed by: FAMILY MEDICINE

## 2021-07-30 PROCEDURE — 99396 PR PREVENTIVE VISIT,EST,40-64: ICD-10-PCS | Mod: S$GLB,,, | Performed by: FAMILY MEDICINE

## 2021-07-30 PROCEDURE — 1159F PR MEDICATION LIST DOCUMENTED IN MEDICAL RECORD: ICD-10-PCS | Mod: CPTII,S$GLB,, | Performed by: FAMILY MEDICINE

## 2021-07-30 PROCEDURE — 3008F PR BODY MASS INDEX (BMI) DOCUMENTED: ICD-10-PCS | Mod: CPTII,S$GLB,, | Performed by: FAMILY MEDICINE

## 2021-07-30 PROCEDURE — 99396 PREV VISIT EST AGE 40-64: CPT | Mod: S$GLB,,, | Performed by: FAMILY MEDICINE

## 2021-07-30 PROCEDURE — 1125F PR PAIN SEVERITY QUANTIFIED, PAIN PRESENT: ICD-10-PCS | Mod: CPTII,S$GLB,, | Performed by: FAMILY MEDICINE

## 2021-07-30 PROCEDURE — 3008F BODY MASS INDEX DOCD: CPT | Mod: CPTII,S$GLB,, | Performed by: FAMILY MEDICINE

## 2021-07-30 RX ORDER — ERGOCALCIFEROL 1.25 MG/1
50000 CAPSULE ORAL
Qty: 12 CAPSULE | Refills: 1 | Status: SHIPPED | OUTPATIENT
Start: 2021-07-30

## 2021-07-30 RX ORDER — ALPRAZOLAM 0.25 MG/1
0.25 TABLET ORAL 2 TIMES DAILY PRN
Qty: 20 TABLET | Refills: 0 | Status: CANCELLED | OUTPATIENT
Start: 2021-07-30 | End: 2021-08-29

## 2021-07-30 RX ORDER — NAPROXEN 500 MG/1
500 TABLET ORAL 2 TIMES DAILY
COMMUNITY
Start: 2021-07-16 | End: 2021-07-30

## 2021-07-30 RX ORDER — ISOSORBIDE MONONITRATE 30 MG/1
1 TABLET, EXTENDED RELEASE ORAL DAILY
COMMUNITY
Start: 2021-02-09 | End: 2022-02-09

## 2021-07-30 RX ORDER — ESOMEPRAZOLE MAGNESIUM 40 MG/1
40 CAPSULE, DELAYED RELEASE ORAL DAILY PRN
Qty: 30 CAPSULE | Refills: 3 | Status: SHIPPED | OUTPATIENT
Start: 2021-07-30

## 2021-07-30 RX ORDER — ALPRAZOLAM 0.25 MG/1
0.25 TABLET ORAL 2 TIMES DAILY PRN
Qty: 20 TABLET | Refills: 0 | Status: SHIPPED | OUTPATIENT
Start: 2021-07-30 | End: 2021-08-29

## 2021-07-30 RX ORDER — ATORVASTATIN CALCIUM 80 MG/1
TABLET, FILM COATED ORAL
COMMUNITY
Start: 2021-07-17

## 2021-07-30 RX ORDER — ALBUTEROL SULFATE 0.83 MG/ML
2.5 SOLUTION RESPIRATORY (INHALATION) EVERY 6 HOURS PRN
Qty: 3 BOX | Refills: 3 | Status: SHIPPED | OUTPATIENT
Start: 2021-07-30 | End: 2022-07-30

## 2021-07-30 RX ORDER — ALBUTEROL SULFATE 90 UG/1
1-2 AEROSOL, METERED RESPIRATORY (INHALATION) EVERY 6 HOURS PRN
Qty: 18 G | Refills: 3 | Status: SHIPPED | OUTPATIENT
Start: 2021-07-30

## 2021-07-30 RX ORDER — DICLOFENAC SODIUM 10 MG/G
GEL TOPICAL
COMMUNITY
Start: 2021-06-29

## 2021-07-30 RX ORDER — ESCITALOPRAM OXALATE 20 MG/1
30 TABLET ORAL NIGHTLY
Qty: 45 TABLET | Refills: 6 | Status: SHIPPED | OUTPATIENT
Start: 2021-07-30 | End: 2022-07-30

## 2021-08-05 ENCOUNTER — HOSPITAL ENCOUNTER (OUTPATIENT)
Dept: RADIOLOGY | Facility: HOSPITAL | Age: 51
Discharge: HOME | End: 2021-08-05
Attending: FAMILY MEDICINE
Payer: COMMERCIAL

## 2021-08-05 DIAGNOSIS — Z12.31 ENCOUNTER FOR SCREENING MAMMOGRAM FOR MALIGNANT NEOPLASM OF BREAST: ICD-10-CM

## 2021-08-05 PROCEDURE — 77067 MAMMO DIGITAL SCREENING BILAT WITH TOMO: ICD-10-PCS | Mod: 26,,, | Performed by: RADIOLOGY

## 2021-08-05 PROCEDURE — 77067 SCR MAMMO BI INCL CAD: CPT | Mod: 26,,, | Performed by: RADIOLOGY

## 2021-08-05 PROCEDURE — 77063 MAMMO DIGITAL SCREENING BILAT WITH TOMO: ICD-10-PCS | Mod: 26,,, | Performed by: RADIOLOGY

## 2021-08-05 PROCEDURE — 77067 SCR MAMMO BI INCL CAD: CPT | Mod: TC

## 2021-08-05 PROCEDURE — 77063 BREAST TOMOSYNTHESIS BI: CPT | Mod: 26,,, | Performed by: RADIOLOGY

## 2021-08-06 ENCOUNTER — PATIENT MESSAGE (OUTPATIENT)
Dept: INTERNAL MEDICINE | Facility: CLINIC | Age: 51
End: 2021-08-06

## 2021-08-06 DIAGNOSIS — U07.1 COVID-19: Primary | ICD-10-CM

## 2022-03-22 ENCOUNTER — PATIENT MESSAGE (OUTPATIENT)
Dept: ADMINISTRATIVE | Facility: HOSPITAL | Age: 52
End: 2022-03-22
Payer: COMMERCIAL

## 2022-12-07 DIAGNOSIS — Z12.31 OTHER SCREENING MAMMOGRAM: ICD-10-CM

## 2022-12-29 ENCOUNTER — PATIENT OUTREACH (OUTPATIENT)
Dept: ADMINISTRATIVE | Facility: HOSPITAL | Age: 52
End: 2022-12-29
Payer: COMMERCIAL

## 2023-01-25 ENCOUNTER — PATIENT MESSAGE (OUTPATIENT)
Dept: ADMINISTRATIVE | Facility: HOSPITAL | Age: 53
End: 2023-01-25
Payer: COMMERCIAL